# Patient Record
Sex: FEMALE | Race: WHITE | Employment: OTHER | URBAN - METROPOLITAN AREA
[De-identification: names, ages, dates, MRNs, and addresses within clinical notes are randomized per-mention and may not be internally consistent; named-entity substitution may affect disease eponyms.]

---

## 2017-05-17 ENCOUNTER — HOSPITAL ENCOUNTER (EMERGENCY)
Age: 67
Discharge: HOME | End: 2017-05-17
Payer: SUBSIDIZED

## 2017-05-17 ENCOUNTER — APPOINTMENT (OUTPATIENT)
Dept: GENERAL RADIOLOGY | Age: 67
End: 2017-05-17
Payer: SUBSIDIZED

## 2017-05-17 VITALS
OXYGEN SATURATION: 99 % | DIASTOLIC BLOOD PRESSURE: 59 MMHG | RESPIRATION RATE: 16 BRPM | HEART RATE: 72 BPM | TEMPERATURE: 97.7 F | SYSTOLIC BLOOD PRESSURE: 111 MMHG | BODY MASS INDEX: 22.82 KG/M2 | WEIGHT: 124 LBS | HEIGHT: 62 IN

## 2017-05-17 DIAGNOSIS — R19.7 NAUSEA VOMITING AND DIARRHEA: Primary | ICD-10-CM

## 2017-05-17 DIAGNOSIS — R11.2 NAUSEA VOMITING AND DIARRHEA: Primary | ICD-10-CM

## 2017-05-17 LAB
ALBUMIN SERPL BCP-MCNC: 3.8 G/DL (ref 3.2–4.6)
ALBUMIN/GLOB SERPL: 1 {RATIO} (ref 1.2–3.5)
ALP SERPL-CCNC: 94 U/L (ref 50–136)
ALT SERPL-CCNC: 37 U/L (ref 12–65)
ANION GAP BLD CALC-SCNC: 18 MMOL/L (ref 7–16)
AST SERPL W P-5'-P-CCNC: 34 U/L (ref 15–37)
BACTERIA URNS QL MICRO: ABNORMAL /HPF
BASOPHILS # BLD AUTO: 0 K/UL (ref 0–0.2)
BASOPHILS # BLD: 0 % (ref 0–2)
BILIRUB SERPL-MCNC: 0.5 MG/DL (ref 0.2–1.1)
BUN SERPL-MCNC: 6 MG/DL (ref 8–23)
CALCIUM SERPL-MCNC: 9.3 MG/DL (ref 8.3–10.4)
CASTS URNS QL MICRO: ABNORMAL /LPF
CHLORIDE SERPL-SCNC: 89 MMOL/L (ref 98–107)
CO2 SERPL-SCNC: 20 MMOL/L (ref 21–32)
CREAT SERPL-MCNC: 1.07 MG/DL (ref 0.6–1)
DIFFERENTIAL METHOD BLD: ABNORMAL
EOSINOPHIL # BLD: 0 K/UL (ref 0–0.8)
EOSINOPHIL NFR BLD: 0 % (ref 0.5–7.8)
EPI CELLS #/AREA URNS HPF: ABNORMAL /HPF
ERYTHROCYTE [DISTWIDTH] IN BLOOD BY AUTOMATED COUNT: 12.9 % (ref 11.9–14.6)
GLOBULIN SER CALC-MCNC: 3.9 G/DL (ref 2.3–3.5)
GLUCOSE SERPL-MCNC: 135 MG/DL (ref 65–100)
HCT VFR BLD AUTO: 38 % (ref 35.8–46.3)
HGB BLD-MCNC: 13 G/DL (ref 11.7–15.4)
IMM GRANULOCYTES # BLD: 0 K/UL (ref 0–0.5)
IMM GRANULOCYTES NFR BLD AUTO: 0.5 % (ref 0–5)
LYMPHOCYTES # BLD AUTO: 21 % (ref 13–44)
LYMPHOCYTES # BLD: 1.6 K/UL (ref 0.5–4.6)
MCH RBC QN AUTO: 26.9 PG (ref 26.1–32.9)
MCHC RBC AUTO-ENTMCNC: 34.2 G/DL (ref 31.4–35)
MCV RBC AUTO: 78.5 FL (ref 79.6–97.8)
MONOCYTES # BLD: 0.5 K/UL (ref 0.1–1.3)
MONOCYTES NFR BLD AUTO: 7 % (ref 4–12)
NEUTS SEG # BLD: 5.5 K/UL (ref 1.7–8.2)
NEUTS SEG NFR BLD AUTO: 72 % (ref 43–78)
PLATELET # BLD AUTO: 351 K/UL (ref 150–450)
PMV BLD AUTO: 9.5 FL (ref 10.8–14.1)
POTASSIUM SERPL-SCNC: 2.5 MMOL/L (ref 3.5–5.1)
PROT SERPL-MCNC: 7.7 G/DL (ref 6.3–8.2)
RBC # BLD AUTO: 4.84 M/UL (ref 4.05–5.25)
RBC #/AREA URNS HPF: 0 /HPF
SODIUM SERPL-SCNC: 127 MMOL/L (ref 136–145)
WBC # BLD AUTO: 7.6 K/UL (ref 4.3–11.1)
WBC URNS QL MICRO: ABNORMAL /HPF

## 2017-05-17 PROCEDURE — 96375 TX/PRO/DX INJ NEW DRUG ADDON: CPT

## 2017-05-17 PROCEDURE — 96361 HYDRATE IV INFUSION ADD-ON: CPT

## 2017-05-17 PROCEDURE — 81015 MICROSCOPIC EXAM OF URINE: CPT

## 2017-05-17 PROCEDURE — 96365 THER/PROPH/DIAG IV INF INIT: CPT

## 2017-05-17 PROCEDURE — 99284 EMERGENCY DEPT VISIT MOD MDM: CPT

## 2017-05-17 PROCEDURE — 74011250636 HC RX REV CODE- 250/636

## 2017-05-17 PROCEDURE — 85025 COMPLETE CBC W/AUTO DIFF WBC: CPT

## 2017-05-17 PROCEDURE — 80053 COMPREHEN METABOLIC PANEL: CPT

## 2017-05-17 PROCEDURE — 96366 THER/PROPH/DIAG IV INF ADDON: CPT

## 2017-05-17 PROCEDURE — 81003 URINALYSIS AUTO W/O SCOPE: CPT

## 2017-05-17 PROCEDURE — 74011250637 HC RX REV CODE- 250/637

## 2017-05-17 PROCEDURE — 74022 RADEX COMPL AQT ABD SERIES: CPT

## 2017-05-17 RX ORDER — ONDANSETRON 2 MG/ML
4 INJECTION INTRAMUSCULAR; INTRAVENOUS
Status: COMPLETED | OUTPATIENT
Start: 2017-05-17 | End: 2017-05-17

## 2017-05-17 RX ORDER — HYOSCYAMINE SULFATE 0.12 MG/1
0.12 TABLET SUBLINGUAL
Status: COMPLETED | OUTPATIENT
Start: 2017-05-17 | End: 2017-05-17

## 2017-05-17 RX ORDER — HYOSCYAMINE SULFATE 0.125 MG
125 TABLET ORAL
Qty: 8 TAB | Refills: 0 | Status: SHIPPED | OUTPATIENT
Start: 2017-05-17

## 2017-05-17 RX ORDER — LORAZEPAM 2 MG/ML
1 INJECTION INTRAMUSCULAR
Status: COMPLETED | OUTPATIENT
Start: 2017-05-17 | End: 2017-05-17

## 2017-05-17 RX ORDER — ONDANSETRON 2 MG/ML
4 INJECTION INTRAMUSCULAR; INTRAVENOUS
Status: DISCONTINUED | OUTPATIENT
Start: 2017-05-17 | End: 2017-05-17 | Stop reason: SDUPTHER

## 2017-05-17 RX ORDER — DEXTROSE, SODIUM CHLORIDE, AND POTASSIUM CHLORIDE 5; .9; .15 G/100ML; G/100ML; G/100ML
1000 INJECTION INTRAVENOUS ONCE
Status: COMPLETED | OUTPATIENT
Start: 2017-05-17 | End: 2017-05-17

## 2017-05-17 RX ORDER — MORPHINE SULFATE 2 MG/ML
2 INJECTION, SOLUTION INTRAMUSCULAR; INTRAVENOUS
Status: COMPLETED | OUTPATIENT
Start: 2017-05-17 | End: 2017-05-17

## 2017-05-17 RX ORDER — PROMETHAZINE HYDROCHLORIDE 25 MG/1
25 TABLET ORAL
Qty: 12 TAB | Refills: 0 | Status: SHIPPED | OUTPATIENT
Start: 2017-05-17

## 2017-05-17 RX ADMIN — DEXTROSE MONOHYDRATE, SODIUM CHLORIDE, AND POTASSIUM CHLORIDE 1000 ML: 50; 9; 1.49 INJECTION, SOLUTION INTRAVENOUS at 10:56

## 2017-05-17 RX ADMIN — ONDANSETRON 4 MG: 2 INJECTION INTRAMUSCULAR; INTRAVENOUS at 08:50

## 2017-05-17 RX ADMIN — SODIUM CHLORIDE 1000 ML: 900 INJECTION, SOLUTION INTRAVENOUS at 08:51

## 2017-05-17 RX ADMIN — LORAZEPAM 1 MG: 2 INJECTION, SOLUTION INTRAMUSCULAR; INTRAVENOUS at 09:55

## 2017-05-17 RX ADMIN — Medication 2 MG: at 14:05

## 2017-05-17 RX ADMIN — HYOSCYAMINE SULFATE 0.12 MG: 0.12 TABLET SUBLINGUAL at 09:56

## 2017-05-17 NOTE — ED PROVIDER NOTES
HPI Comments: 29-year-old female complaining of nausea vomiting diarrhea onset 2 days ago but was worse last night. Patient feels like she is dehydrated. Patient is a 61 y.o. female presenting with vomiting. Vomiting    The current episode started 2 days ago. The problem has not changed since onset. The emesis has an appearance of stomach contents. There has been no fever. Associated symptoms include a fever and abdominal pain. Risk factors include ill contacts. Her pertinent negatives include no DM. Past Medical History:   Diagnosis Date    CAD (coronary artery disease)     Hypertension        History reviewed. No pertinent surgical history. History reviewed. No pertinent family history. Social History     Social History    Marital status:      Spouse name: N/A    Number of children: N/A    Years of education: N/A     Occupational History    Not on file. Social History Main Topics    Smoking status: Never Smoker    Smokeless tobacco: Not on file    Alcohol use Not on file    Drug use: Not on file    Sexual activity: Not on file     Other Topics Concern    Not on file     Social History Narrative    No narrative on file         ALLERGIES: Pcn [penicillins]    Review of Systems   Constitutional: Positive for fever. Negative for activity change. HENT: Negative. Eyes: Negative. Respiratory: Negative. Cardiovascular: Negative. Gastrointestinal: Positive for abdominal pain and vomiting. Genitourinary: Negative. Musculoskeletal: Negative. Skin: Negative. Neurological: Negative. Psychiatric/Behavioral: Negative. All other systems reviewed and are negative. Vitals:    05/17/17 0848 05/17/17 0918 05/17/17 1018 05/17/17 1048   BP: 133/63 143/72 127/70 134/69   Pulse:       Resp:       Temp:       SpO2:  (!) 88% 100% 99%   Weight:       Height:                Physical Exam   Constitutional: She is oriented to person, place, and time.  She appears well-developed and well-nourished. No distress. HENT:   Head: Normocephalic and atraumatic. Right Ear: External ear normal.   Left Ear: External ear normal.   Nose: Nose normal.   Mouth/Throat: Oropharynx is clear and moist. No oropharyngeal exudate. Eyes: Conjunctivae and EOM are normal. Pupils are equal, round, and reactive to light. Right eye exhibits no discharge. Left eye exhibits no discharge. No scleral icterus. Neck: Normal range of motion. Neck supple. No JVD present. No tracheal deviation present. Cardiovascular: Normal rate, regular rhythm and intact distal pulses. Pulmonary/Chest: Effort normal and breath sounds normal. No stridor. No respiratory distress. She has no wheezes. She exhibits no tenderness. Abdominal: Soft. Bowel sounds are normal. She exhibits no distension and no mass. There is no tenderness. Musculoskeletal: Normal range of motion. She exhibits no edema or tenderness. Neurological: She is alert and oriented to person, place, and time. No cranial nerve deficit. Skin: Skin is warm and dry. No rash noted. She is not diaphoretic. No erythema. No pallor. Psychiatric: She has a normal mood and affect. Her behavior is normal. Thought content normal.   Nursing note and vitals reviewed. MDM  Number of Diagnoses or Management Options  Nausea vomiting and diarrhea:   Diagnosis management comments: Serial exams abdomen no acute peritoneal signs noted patient's symptoms much relieved. Patient hydrated. Patient will take fluids by mouth before discharge.   Will follow up closely with her primary care physician will place her on anti emetic and anti-spasmodic's       Amount and/or Complexity of Data Reviewed  Clinical lab tests: ordered and reviewed  Tests in the radiology section of CPT®: ordered and reviewed  Tests in the medicine section of CPT®: ordered and reviewed      ED Course       Procedures

## 2017-07-06 PROCEDURE — 88305 TISSUE EXAM BY PATHOLOGIST: CPT | Performed by: INTERNAL MEDICINE

## 2017-07-07 ENCOUNTER — HOSPITAL ENCOUNTER (OUTPATIENT)
Dept: LAB | Age: 67
Discharge: HOME | End: 2017-07-07

## 2018-12-18 ENCOUNTER — APPOINTMENT (OUTPATIENT)
Dept: CT IMAGING | Age: 68
DRG: 644 | End: 2018-12-18
Attending: EMERGENCY MEDICINE
Payer: MEDICARE

## 2018-12-18 ENCOUNTER — HOSPITAL ENCOUNTER (INPATIENT)
Age: 68
LOS: 4 days | Discharge: HOME | DRG: 644 | End: 2018-12-22
Attending: EMERGENCY MEDICINE | Admitting: FAMILY MEDICINE
Payer: MEDICARE

## 2018-12-18 DIAGNOSIS — R56.9 SEIZURE (HCC): ICD-10-CM

## 2018-12-18 DIAGNOSIS — E87.1 HYPONATREMIA: Primary | ICD-10-CM

## 2018-12-18 PROBLEM — G40.409 TONIC-CLONIC SEIZURE DISORDER (HCC): Status: ACTIVE | Noted: 2018-12-18

## 2018-12-18 LAB
ANION GAP SERPL CALC-SCNC: 18 MMOL/L (ref 7–16)
APTT PPP: 23.9 SEC (ref 24.7–39.8)
BASOPHILS # BLD: 0 K/UL (ref 0–0.2)
BASOPHILS NFR BLD: 0 % (ref 0–2)
BUN SERPL-MCNC: 7 MG/DL (ref 8–23)
CALCIUM SERPL-MCNC: 8 MG/DL (ref 8.3–10.4)
CHLORIDE SERPL-SCNC: 70 MMOL/L (ref 98–107)
CO2 SERPL-SCNC: 19 MMOL/L (ref 21–32)
CREAT SERPL-MCNC: 1.05 MG/DL (ref 0.6–1)
DIFFERENTIAL METHOD BLD: ABNORMAL
EOSINOPHIL # BLD: 0 K/UL (ref 0–0.8)
EOSINOPHIL NFR BLD: 0 % (ref 0.5–7.8)
ERYTHROCYTE [DISTWIDTH] IN BLOOD BY AUTOMATED COUNT: 11.9 % (ref 11.9–14.6)
GLUCOSE BLD STRIP.AUTO-MCNC: 210 MG/DL (ref 65–100)
GLUCOSE SERPL-MCNC: 171 MG/DL (ref 65–100)
HCT VFR BLD AUTO: 30.1 % (ref 35.8–46.3)
HGB BLD-MCNC: 10.7 G/DL (ref 11.7–15.4)
IMM GRANULOCYTES # BLD: 0.4 K/UL (ref 0–0.5)
IMM GRANULOCYTES NFR BLD AUTO: 3 % (ref 0–5)
INR BLD: 1 (ref 0.9–1.2)
LYMPHOCYTES # BLD: 1.1 K/UL (ref 0.5–4.6)
LYMPHOCYTES NFR BLD: 8 % (ref 13–44)
MAGNESIUM SERPL-MCNC: 1.2 MG/DL (ref 1.8–2.4)
MCH RBC QN AUTO: 27 PG (ref 26.1–32.9)
MCHC RBC AUTO-ENTMCNC: 35.5 G/DL (ref 31.4–35)
MCV RBC AUTO: 75.8 FL (ref 79.6–97.8)
MONOCYTES # BLD: 0.7 K/UL (ref 0.1–1.3)
MONOCYTES NFR BLD: 4 % (ref 4–12)
NEUTS SEG # BLD: 12.6 K/UL (ref 1.7–8.2)
NEUTS SEG NFR BLD: 85 % (ref 43–78)
NRBC # BLD: 0 K/UL (ref 0–0.2)
PHOSPHATE SERPL-MCNC: 1.7 MG/DL (ref 2.3–3.7)
PLATELET # BLD AUTO: 339 K/UL (ref 150–450)
PMV BLD AUTO: 9.9 FL (ref 9.4–12.3)
POTASSIUM SERPL-SCNC: 2.9 MMOL/L (ref 3.5–5.1)
PT BLD: 12.4 SECS (ref 9.6–11.6)
RBC # BLD AUTO: 3.97 M/UL (ref 4.05–5.2)
SODIUM SERPL-SCNC: 107 MMOL/L (ref 136–145)
WBC # BLD AUTO: 14.8 K/UL (ref 4.3–11.1)

## 2018-12-18 PROCEDURE — 80048 BASIC METABOLIC PNL TOTAL CA: CPT

## 2018-12-18 PROCEDURE — 93005 ELECTROCARDIOGRAM TRACING: CPT | Performed by: FAMILY MEDICINE

## 2018-12-18 PROCEDURE — 96374 THER/PROPH/DIAG INJ IV PUSH: CPT | Performed by: EMERGENCY MEDICINE

## 2018-12-18 PROCEDURE — 65610000006 HC RM INTENSIVE CARE

## 2018-12-18 PROCEDURE — 82962 GLUCOSE BLOOD TEST: CPT

## 2018-12-18 PROCEDURE — 83735 ASSAY OF MAGNESIUM: CPT

## 2018-12-18 PROCEDURE — 99285 EMERGENCY DEPT VISIT HI MDM: CPT | Performed by: EMERGENCY MEDICINE

## 2018-12-18 PROCEDURE — 04HK33Z INSERTION OF INFUSION DEVICE INTO RIGHT FEMORAL ARTERY, PERCUTANEOUS APPROACH: ICD-10-PCS | Performed by: EMERGENCY MEDICINE

## 2018-12-18 PROCEDURE — 85730 THROMBOPLASTIN TIME PARTIAL: CPT

## 2018-12-18 PROCEDURE — 75810000137 HC PLCMT CENT VENOUS CATH: Performed by: EMERGENCY MEDICINE

## 2018-12-18 PROCEDURE — 85610 PROTHROMBIN TIME: CPT

## 2018-12-18 PROCEDURE — 85025 COMPLETE CBC W/AUTO DIFF WBC: CPT

## 2018-12-18 PROCEDURE — 74011250636 HC RX REV CODE- 250/636: Performed by: INTERNAL MEDICINE

## 2018-12-18 PROCEDURE — 96375 TX/PRO/DX INJ NEW DRUG ADDON: CPT | Performed by: EMERGENCY MEDICINE

## 2018-12-18 PROCEDURE — 84100 ASSAY OF PHOSPHORUS: CPT

## 2018-12-18 PROCEDURE — C1751 CATH, INF, PER/CENT/MIDLINE: HCPCS

## 2018-12-18 PROCEDURE — 70450 CT HEAD/BRAIN W/O DYE: CPT

## 2018-12-18 PROCEDURE — 74011250636 HC RX REV CODE- 250/636: Performed by: EMERGENCY MEDICINE

## 2018-12-18 RX ORDER — HYDROCODONE BITARTRATE AND ACETAMINOPHEN 5; 325 MG/1; MG/1
1 TABLET ORAL
Status: DISCONTINUED | OUTPATIENT
Start: 2018-12-18 | End: 2018-12-22 | Stop reason: HOSPADM

## 2018-12-18 RX ORDER — LORAZEPAM 0.5 MG/1
0.5 TABLET ORAL
Status: DISCONTINUED | OUTPATIENT
Start: 2018-12-18 | End: 2018-12-22 | Stop reason: HOSPADM

## 2018-12-18 RX ORDER — LORAZEPAM 2 MG/ML
1 INJECTION INTRAMUSCULAR
Status: COMPLETED | OUTPATIENT
Start: 2018-12-18 | End: 2018-12-18

## 2018-12-18 RX ORDER — 3% SODIUM CHLORIDE 3 G/100ML
30 INJECTION, SOLUTION INTRAVENOUS CONTINUOUS
Status: DISCONTINUED | OUTPATIENT
Start: 2018-12-19 | End: 2018-12-19

## 2018-12-18 RX ORDER — ZOLPIDEM TARTRATE 5 MG/1
5 TABLET ORAL
Status: DISCONTINUED | OUTPATIENT
Start: 2018-12-18 | End: 2018-12-19

## 2018-12-18 RX ORDER — ACETAMINOPHEN 325 MG/1
650 TABLET ORAL
Status: DISCONTINUED | OUTPATIENT
Start: 2018-12-18 | End: 2018-12-22 | Stop reason: HOSPADM

## 2018-12-18 RX ORDER — POTASSIUM CHLORIDE 29.8 MG/ML
20 INJECTION INTRAVENOUS
Status: COMPLETED | OUTPATIENT
Start: 2018-12-18 | End: 2018-12-19

## 2018-12-18 RX ORDER — LORAZEPAM 2 MG/ML
2 INJECTION INTRAMUSCULAR
Status: DISCONTINUED | OUTPATIENT
Start: 2018-12-18 | End: 2018-12-18 | Stop reason: ALTCHOICE

## 2018-12-18 RX ORDER — SODIUM CHLORIDE 0.9 % (FLUSH) 0.9 %
5-10 SYRINGE (ML) INJECTION AS NEEDED
Status: DISCONTINUED | OUTPATIENT
Start: 2018-12-18 | End: 2018-12-22 | Stop reason: HOSPADM

## 2018-12-18 RX ORDER — NALOXONE HYDROCHLORIDE 0.4 MG/ML
0.4 INJECTION, SOLUTION INTRAMUSCULAR; INTRAVENOUS; SUBCUTANEOUS AS NEEDED
Status: DISCONTINUED | OUTPATIENT
Start: 2018-12-18 | End: 2018-12-22 | Stop reason: HOSPADM

## 2018-12-18 RX ORDER — 3% SODIUM CHLORIDE 3 G/100ML
50 INJECTION, SOLUTION INTRAVENOUS CONTINUOUS
Status: DISPENSED | OUTPATIENT
Start: 2018-12-18 | End: 2018-12-19

## 2018-12-18 RX ORDER — SODIUM CHLORIDE 0.9 % (FLUSH) 0.9 %
5-10 SYRINGE (ML) INJECTION EVERY 8 HOURS
Status: DISCONTINUED | OUTPATIENT
Start: 2018-12-18 | End: 2018-12-22 | Stop reason: HOSPADM

## 2018-12-18 RX ORDER — ENOXAPARIN SODIUM 100 MG/ML
40 INJECTION SUBCUTANEOUS EVERY 24 HOURS
Status: DISCONTINUED | OUTPATIENT
Start: 2018-12-18 | End: 2018-12-19

## 2018-12-18 RX ORDER — LORAZEPAM 2 MG/ML
1 INJECTION INTRAMUSCULAR
Status: DISCONTINUED | OUTPATIENT
Start: 2018-12-18 | End: 2018-12-18

## 2018-12-18 RX ADMIN — LORAZEPAM 1 MG: 2 INJECTION INTRAMUSCULAR; INTRAVENOUS at 20:52

## 2018-12-18 RX ADMIN — SODIUM CHLORIDE 50 ML/HR: 3 INJECTION, SOLUTION INTRAVENOUS at 23:08

## 2018-12-18 RX ADMIN — POTASSIUM CHLORIDE 20 MEQ: 400 INJECTION, SOLUTION INTRAVENOUS at 23:04

## 2018-12-19 ENCOUNTER — APPOINTMENT (OUTPATIENT)
Dept: GENERAL RADIOLOGY | Age: 68
DRG: 644 | End: 2018-12-19
Attending: FAMILY MEDICINE
Payer: MEDICARE

## 2018-12-19 ENCOUNTER — APPOINTMENT (OUTPATIENT)
Dept: MRI IMAGING | Age: 68
DRG: 644 | End: 2018-12-19
Attending: FAMILY MEDICINE
Payer: MEDICARE

## 2018-12-19 PROBLEM — E83.51 HYPOCALCEMIA: Status: ACTIVE | Noted: 2018-12-19

## 2018-12-19 PROBLEM — E83.42 HYPOMAGNESEMIA: Status: ACTIVE | Noted: 2018-12-19

## 2018-12-19 PROBLEM — E78.5 HLD (HYPERLIPIDEMIA): Status: ACTIVE | Noted: 2018-12-19

## 2018-12-19 PROBLEM — E87.20 METABOLIC ACIDOSIS: Status: ACTIVE | Noted: 2018-12-19

## 2018-12-19 PROBLEM — M81.0 OSTEOPOROSIS: Status: ACTIVE | Noted: 2018-12-19

## 2018-12-19 PROBLEM — E87.6 HYPOKALEMIA: Status: ACTIVE | Noted: 2018-12-19

## 2018-12-19 LAB
ANION GAP SERPL CALC-SCNC: 10 MMOL/L (ref 7–16)
ANION GAP SERPL CALC-SCNC: 12 MMOL/L (ref 7–16)
ANION GAP SERPL CALC-SCNC: 6 MMOL/L (ref 7–16)
ANION GAP SERPL CALC-SCNC: 9 MMOL/L (ref 7–16)
ATRIAL RATE: 214 BPM
BUN SERPL-MCNC: 7 MG/DL (ref 8–23)
BUN SERPL-MCNC: 7 MG/DL (ref 8–23)
BUN SERPL-MCNC: 8 MG/DL (ref 8–23)
BUN SERPL-MCNC: 8 MG/DL (ref 8–23)
CALCIUM SERPL-MCNC: 7.2 MG/DL (ref 8.3–10.4)
CALCIUM SERPL-MCNC: 7.3 MG/DL (ref 8.3–10.4)
CALCIUM SERPL-MCNC: 7.5 MG/DL (ref 8.3–10.4)
CALCIUM SERPL-MCNC: 7.7 MG/DL (ref 8.3–10.4)
CALCULATED R AXIS, ECG10: 36 DEGREES
CALCULATED T AXIS, ECG11: -67 DEGREES
CHLORIDE SERPL-SCNC: 78 MMOL/L (ref 98–107)
CHLORIDE SERPL-SCNC: 87 MMOL/L (ref 98–107)
CHLORIDE SERPL-SCNC: 93 MMOL/L (ref 98–107)
CHLORIDE SERPL-SCNC: 97 MMOL/L (ref 98–107)
CO2 SERPL-SCNC: 21 MMOL/L (ref 21–32)
CO2 SERPL-SCNC: 24 MMOL/L (ref 21–32)
CO2 SERPL-SCNC: 24 MMOL/L (ref 21–32)
CO2 SERPL-SCNC: 25 MMOL/L (ref 21–32)
CREAT SERPL-MCNC: 0.81 MG/DL (ref 0.6–1)
CREAT SERPL-MCNC: 0.81 MG/DL (ref 0.6–1)
CREAT SERPL-MCNC: 0.95 MG/DL (ref 0.6–1)
CREAT SERPL-MCNC: 0.95 MG/DL (ref 0.6–1)
DIAGNOSIS, 93000: NORMAL
ERYTHROCYTE [DISTWIDTH] IN BLOOD BY AUTOMATED COUNT: 11.9 % (ref 11.9–14.6)
EST. AVERAGE GLUCOSE BLD GHB EST-MCNC: 114 MG/DL
GLUCOSE BLD STRIP.AUTO-MCNC: 102 MG/DL (ref 65–100)
GLUCOSE BLD STRIP.AUTO-MCNC: 103 MG/DL (ref 65–100)
GLUCOSE BLD STRIP.AUTO-MCNC: 95 MG/DL (ref 65–100)
GLUCOSE BLD STRIP.AUTO-MCNC: 98 MG/DL (ref 65–100)
GLUCOSE SERPL-MCNC: 100 MG/DL (ref 65–100)
GLUCOSE SERPL-MCNC: 113 MG/DL (ref 65–100)
GLUCOSE SERPL-MCNC: 93 MG/DL (ref 65–100)
GLUCOSE SERPL-MCNC: 93 MG/DL (ref 65–100)
HBA1C MFR BLD: 5.6 % (ref 4.8–6)
HCT VFR BLD AUTO: 27.2 % (ref 35.8–46.3)
HGB BLD-MCNC: 9.5 G/DL (ref 11.7–15.4)
MAGNESIUM SERPL-MCNC: 2 MG/DL (ref 1.8–2.4)
MCH RBC QN AUTO: 26.4 PG (ref 26.1–32.9)
MCHC RBC AUTO-ENTMCNC: 34.9 G/DL (ref 31.4–35)
MCV RBC AUTO: 75.6 FL (ref 79.6–97.8)
NRBC # BLD: 0 K/UL (ref 0–0.2)
PHOSPHATE SERPL-MCNC: 1.5 MG/DL (ref 2.3–3.7)
PHOSPHATE SERPL-MCNC: 1.7 MG/DL (ref 2.3–3.7)
PLATELET # BLD AUTO: 288 K/UL (ref 150–450)
PMV BLD AUTO: 9.3 FL (ref 9.4–12.3)
POTASSIUM SERPL-SCNC: 2.4 MMOL/L (ref 3.5–5.1)
POTASSIUM SERPL-SCNC: 3 MMOL/L (ref 3.5–5.1)
POTASSIUM SERPL-SCNC: 3.8 MMOL/L (ref 3.5–5.1)
POTASSIUM SERPL-SCNC: 4.1 MMOL/L (ref 3.5–5.1)
Q-T INTERVAL, ECG07: 370 MS
QRS DURATION, ECG06: 104 MS
QTC CALCULATION (BEZET), ECG08: 407 MS
RBC # BLD AUTO: 3.6 M/UL (ref 4.05–5.2)
SODIUM SERPL-SCNC: 112 MMOL/L (ref 136–145)
SODIUM SERPL-SCNC: 120 MMOL/L (ref 136–145)
SODIUM SERPL-SCNC: 126 MMOL/L (ref 136–145)
SODIUM SERPL-SCNC: 128 MMOL/L (ref 136–145)
T4 FREE SERPL-MCNC: 1.3 NG/DL (ref 0.78–1.46)
TSH SERPL DL<=0.005 MIU/L-ACNC: 2.88 UIU/ML (ref 0.36–3.74)
VENTRICULAR RATE, ECG03: 73 BPM
WBC # BLD AUTO: 10.5 K/UL (ref 4.3–11.1)

## 2018-12-19 PROCEDURE — 74011250636 HC RX REV CODE- 250/636: Performed by: INTERNAL MEDICINE

## 2018-12-19 PROCEDURE — 65610000001 HC ROOM ICU GENERAL

## 2018-12-19 PROCEDURE — 85027 COMPLETE CBC AUTOMATED: CPT

## 2018-12-19 PROCEDURE — 74011250637 HC RX REV CODE- 250/637: Performed by: FAMILY MEDICINE

## 2018-12-19 PROCEDURE — 84439 ASSAY OF FREE THYROXINE: CPT

## 2018-12-19 PROCEDURE — 65270000029 HC RM PRIVATE

## 2018-12-19 PROCEDURE — 77030034850

## 2018-12-19 PROCEDURE — 74011250637 HC RX REV CODE- 250/637: Performed by: INTERNAL MEDICINE

## 2018-12-19 PROCEDURE — 36415 COLL VENOUS BLD VENIPUNCTURE: CPT

## 2018-12-19 PROCEDURE — 36592 COLLECT BLOOD FROM PICC: CPT

## 2018-12-19 PROCEDURE — 70551 MRI BRAIN STEM W/O DYE: CPT

## 2018-12-19 PROCEDURE — 84100 ASSAY OF PHOSPHORUS: CPT

## 2018-12-19 PROCEDURE — 95816 EEG AWAKE AND DROWSY: CPT | Performed by: FAMILY MEDICINE

## 2018-12-19 PROCEDURE — 74011000258 HC RX REV CODE- 258: Performed by: FAMILY MEDICINE

## 2018-12-19 PROCEDURE — 84443 ASSAY THYROID STIM HORMONE: CPT

## 2018-12-19 PROCEDURE — 51798 US URINE CAPACITY MEASURE: CPT

## 2018-12-19 PROCEDURE — 74011250636 HC RX REV CODE- 250/636: Performed by: FAMILY MEDICINE

## 2018-12-19 PROCEDURE — 82962 GLUCOSE BLOOD TEST: CPT

## 2018-12-19 PROCEDURE — 80048 BASIC METABOLIC PNL TOTAL CA: CPT

## 2018-12-19 PROCEDURE — 74011000250 HC RX REV CODE- 250: Performed by: FAMILY MEDICINE

## 2018-12-19 PROCEDURE — 83735 ASSAY OF MAGNESIUM: CPT

## 2018-12-19 PROCEDURE — 02HV33Z INSERTION OF INFUSION DEVICE INTO SUPERIOR VENA CAVA, PERCUTANEOUS APPROACH: ICD-10-PCS | Performed by: INTERNAL MEDICINE

## 2018-12-19 PROCEDURE — 83036 HEMOGLOBIN GLYCOSYLATED A1C: CPT

## 2018-12-19 PROCEDURE — 71045 X-RAY EXAM CHEST 1 VIEW: CPT

## 2018-12-19 PROCEDURE — 77030020250 HC SOL INJ D 5% LFCR 1000ML BG LF

## 2018-12-19 PROCEDURE — 77030019605

## 2018-12-19 PROCEDURE — C1751 CATH, INF, PER/CENT/MIDLINE: HCPCS

## 2018-12-19 PROCEDURE — 36569 INSJ PICC 5 YR+ W/O IMAGING: CPT | Performed by: INTERNAL MEDICINE

## 2018-12-19 PROCEDURE — 76937 US GUIDE VASCULAR ACCESS: CPT

## 2018-12-19 RX ORDER — ASPIRIN 81 MG/1
81 TABLET ORAL DAILY
COMMUNITY

## 2018-12-19 RX ORDER — HEPARIN SODIUM 5000 [USP'U]/ML
5000 INJECTION, SOLUTION INTRAVENOUS; SUBCUTANEOUS EVERY 8 HOURS
Status: DISCONTINUED | OUTPATIENT
Start: 2018-12-19 | End: 2018-12-22 | Stop reason: HOSPADM

## 2018-12-19 RX ORDER — SODIUM CHLORIDE 0.9 % (FLUSH) 0.9 %
20 SYRINGE (ML) INJECTION EVERY 8 HOURS
Status: DISCONTINUED | OUTPATIENT
Start: 2018-12-19 | End: 2018-12-22 | Stop reason: HOSPADM

## 2018-12-19 RX ORDER — SODIUM CHLORIDE 0.9 % (FLUSH) 0.9 %
20 SYRINGE (ML) INJECTION AS NEEDED
Status: DISCONTINUED | OUTPATIENT
Start: 2018-12-19 | End: 2018-12-22 | Stop reason: HOSPADM

## 2018-12-19 RX ORDER — MAGNESIUM SULFATE 1 G/100ML
1 INJECTION INTRAVENOUS
Status: COMPLETED | OUTPATIENT
Start: 2018-12-19 | End: 2018-12-19

## 2018-12-19 RX ORDER — POTASSIUM CHLORIDE 14.9 MG/ML
20 INJECTION INTRAVENOUS
Status: COMPLETED | OUTPATIENT
Start: 2018-12-19 | End: 2018-12-19

## 2018-12-19 RX ORDER — HEPARIN 100 UNIT/ML
600 SYRINGE INTRAVENOUS EVERY 8 HOURS
Status: DISCONTINUED | OUTPATIENT
Start: 2018-12-19 | End: 2018-12-22 | Stop reason: HOSPADM

## 2018-12-19 RX ORDER — HEPARIN 100 UNIT/ML
600 SYRINGE INTRAVENOUS AS NEEDED
Status: DISCONTINUED | OUTPATIENT
Start: 2018-12-19 | End: 2018-12-22 | Stop reason: HOSPADM

## 2018-12-19 RX ORDER — DEXTROSE MONOHYDRATE 50 MG/ML
50 INJECTION, SOLUTION INTRAVENOUS CONTINUOUS
Status: DISCONTINUED | OUTPATIENT
Start: 2018-12-19 | End: 2018-12-22

## 2018-12-19 RX ORDER — HEPARIN SODIUM 5000 [USP'U]/ML
5000 INJECTION, SOLUTION INTRAVENOUS; SUBCUTANEOUS EVERY 8 HOURS
Status: DISCONTINUED | OUTPATIENT
Start: 2018-12-19 | End: 2018-12-19

## 2018-12-19 RX ORDER — ASCORBIC ACID 500 MG
500 TABLET ORAL DAILY
Status: DISCONTINUED | OUTPATIENT
Start: 2018-12-19 | End: 2018-12-22 | Stop reason: HOSPADM

## 2018-12-19 RX ORDER — SODIUM BICARBONATE 650 MG/1
650 TABLET ORAL EVERY 6 HOURS
Status: DISCONTINUED | OUTPATIENT
Start: 2018-12-19 | End: 2018-12-22 | Stop reason: HOSPADM

## 2018-12-19 RX ORDER — INSULIN LISPRO 100 [IU]/ML
INJECTION, SOLUTION INTRAVENOUS; SUBCUTANEOUS
Status: DISCONTINUED | OUTPATIENT
Start: 2018-12-19 | End: 2018-12-22 | Stop reason: HOSPADM

## 2018-12-19 RX ORDER — MELATONIN
5000 DAILY
Status: DISCONTINUED | OUTPATIENT
Start: 2018-12-19 | End: 2018-12-22 | Stop reason: HOSPADM

## 2018-12-19 RX ORDER — POTASSIUM CHLORIDE 14.9 MG/ML
20 INJECTION INTRAVENOUS
Status: DISCONTINUED | OUTPATIENT
Start: 2018-12-19 | End: 2018-12-19

## 2018-12-19 RX ADMIN — LORAZEPAM 0.5 MG: 0.5 TABLET ORAL at 00:42

## 2018-12-19 RX ADMIN — VITAMIN D, TAB 1000IU (100/BT) 5000 UNITS: 25 TAB at 10:09

## 2018-12-19 RX ADMIN — HYDROCODONE BITARTRATE AND ACETAMINOPHEN 1 TABLET: 5; 325 TABLET ORAL at 18:22

## 2018-12-19 RX ADMIN — POTASSIUM CHLORIDE 20 MEQ: 200 INJECTION, SOLUTION INTRAVENOUS at 09:09

## 2018-12-19 RX ADMIN — Medication 20 ML: at 21:17

## 2018-12-19 RX ADMIN — HEPARIN SODIUM 5000 UNITS: 5000 INJECTION INTRAVENOUS; SUBCUTANEOUS at 06:21

## 2018-12-19 RX ADMIN — HEPARIN SODIUM 5000 UNITS: 5000 INJECTION INTRAVENOUS; SUBCUTANEOUS at 21:17

## 2018-12-19 RX ADMIN — POTASSIUM CHLORIDE 20 MEQ: 200 INJECTION, SOLUTION INTRAVENOUS at 04:09

## 2018-12-19 RX ADMIN — SODIUM BICARBONATE 650 MG TABLET 650 MG: at 06:21

## 2018-12-19 RX ADMIN — OXYCODONE HYDROCHLORIDE AND ACETAMINOPHEN 500 MG: 500 TABLET ORAL at 10:09

## 2018-12-19 RX ADMIN — POTASSIUM CHLORIDE 20 MEQ: 200 INJECTION, SOLUTION INTRAVENOUS at 18:25

## 2018-12-19 RX ADMIN — SODIUM CHLORIDE 500 MG: 900 INJECTION, SOLUTION INTRAVENOUS at 14:02

## 2018-12-19 RX ADMIN — Medication 10 ML: at 01:15

## 2018-12-19 RX ADMIN — DEXTROSE MONOHYDRATE 50 ML/HR: 5 INJECTION, SOLUTION INTRAVENOUS at 16:25

## 2018-12-19 RX ADMIN — SODIUM CHLORIDE, PRESERVATIVE FREE 600 UNITS: 5 INJECTION INTRAVENOUS at 16:24

## 2018-12-19 RX ADMIN — POTASSIUM CHLORIDE 20 MEQ: 200 INJECTION, SOLUTION INTRAVENOUS at 01:56

## 2018-12-19 RX ADMIN — SODIUM CHLORIDE, PRESERVATIVE FREE 600 UNITS: 5 INJECTION INTRAVENOUS at 21:17

## 2018-12-19 RX ADMIN — POTASSIUM PHOSPHATE, MONOBASIC AND POTASSIUM PHOSPHATE, DIBASIC: 224; 236 INJECTION, SOLUTION INTRAVENOUS at 06:21

## 2018-12-19 RX ADMIN — SODIUM BICARBONATE 650 MG TABLET 650 MG: at 12:14

## 2018-12-19 RX ADMIN — Medication 20 ML: at 16:24

## 2018-12-19 RX ADMIN — SODIUM BICARBONATE 650 MG TABLET 650 MG: at 18:13

## 2018-12-19 RX ADMIN — ACETAMINOPHEN 650 MG: 325 TABLET, FILM COATED ORAL at 12:17

## 2018-12-19 RX ADMIN — SODIUM CHLORIDE 1000 MG: 900 INJECTION, SOLUTION INTRAVENOUS at 01:56

## 2018-12-19 RX ADMIN — SODIUM BICARBONATE 650 MG TABLET 650 MG: at 01:56

## 2018-12-19 RX ADMIN — POTASSIUM CHLORIDE 20 MEQ: 200 INJECTION, SOLUTION INTRAVENOUS at 12:12

## 2018-12-19 RX ADMIN — HEPARIN SODIUM 5000 UNITS: 5000 INJECTION INTRAVENOUS; SUBCUTANEOUS at 14:07

## 2018-12-19 RX ADMIN — Medication 10 ML: at 21:18

## 2018-12-19 RX ADMIN — POTASSIUM CHLORIDE 20 MEQ: 200 INJECTION, SOLUTION INTRAVENOUS at 16:26

## 2018-12-19 RX ADMIN — Medication 10 ML: at 14:02

## 2018-12-19 RX ADMIN — Medication 10 ML: at 06:22

## 2018-12-19 RX ADMIN — MAGNESIUM SULFATE HEPTAHYDRATE 1 G: 1 INJECTION, SOLUTION INTRAVENOUS at 01:12

## 2018-12-19 NOTE — CONSULTS
Holding Note -    Asked by Dr. Virginia Hastings to advise regarding correction of hyponatremia -   1. Hyponatremia - 3% saline 500 mL to run at 50 mL/hr x 4 hours, then 30 mL/hr x 10 hours  2. Hypokalemia - KCL 20 mEq IV q 4 hours x three doses  3. Acidemia - sodium bicarbonate 650 mg q 6 hours PO  4. Hypocalcemia - Vitamin G 5000 units PO daily  5. Add Magnesium and phosphorus level now and daily  6. BMP q 6 hours       Results for Raman Merritt" (MRN [id number]) as of 12/18/2018 22:32   Ref. Range 5/17/2017 07:54 12/18/2018 20:50   Sodium Latest Ref Range: 136 - 145 mmol/L 127 (L) 107 (LL)   Potassium Latest Ref Range: 3.5 - 5.1 mmol/L 2.5 (LL) 2.9 (LL)   Chloride Latest Ref Range: 98 - 107 mmol/L 89 (L) 70 (L)   CO2 Latest Ref Range: 21 - 32 mmol/L 20 (L) 19 (L)   Anion gap Latest Ref Range: 7 - 16 mmol/L 18 (H) 18 (H)   Glucose Latest Ref Range: 65 - 100 mg/dL 135 (H) 171 (H)   BUN Latest Ref Range: 8 - 23 MG/DL 6 (L) 7 (L)   Creatinine Latest Ref Range: 0.6 - 1.0 MG/DL 1.07 (H) 1.05 (H)   Calcium Latest Ref Range: 8.3 - 10.4 MG/DL 9.3 8.0 (L)   GFR est non-AA Latest Ref Range: >60 ml/min/1.73m2 55 (L) 56 (L)   GFR est AA Latest Ref Range: >60 ml/min/1.73m2 >60 >60   Bilirubin, total Latest Ref Range: 0.2 - 1.1 MG/DL 0.5    Protein, total Latest Ref Range: 6.3 - 8.2 g/dL 7.7    Albumin Latest Ref Range: 3.2 - 4.6 g/dL 3.8    Globulin Latest Ref Range: 2.3 - 3.5 g/dL 3.9 (H)    A-G Ratio Latest Ref Range: 1.2 - 3.5   1.0 (L)    ALT (SGPT) Latest Ref Range: 12 - 65 U/L 37    AST Latest Ref Range: 15 - 37 U/L 34    Alk.  phosphatase Latest Ref Range: 50 - 136 U/L 94

## 2018-12-19 NOTE — PROGRESS NOTES
Dr. Kelechi Landry called and updated on previous BMP, Na+ up to 126 (6 point increase in 6 hours after stopping 3% saline) and K+ up to 3.8 with an order for 40 mEq left to infuse. Per Dr. Kelechi Landry, infuse remaining potassium chloride ordered and start D5W gtt at 50 ml/hr. New orders placed in [facility] care.

## 2018-12-19 NOTE — PROGRESS NOTES
Bedside shift change report given to Nito Still (oncoming nurse) by Ahmet Damico RN (offgoing nurse). Report included the following information SBAR, Kardex, ED Summary, Intake/Output, MAR, Recent Results, Med Rec Status, Cardiac Rhythm NSR and Alarm Parameters . Skin assessed. Patient turns self. Dual neuro assessment completed at shift change, alert, follows commands, oriented to person and time, disoriented to place and situation. PERRLA,  3 mm. Very restless/fidgety with any stimulation. NSR on monitor, BP stable. Breath sounds clear, room air. Abdomen is soft, intact, bowel sounds active. Reynolds cath in place, draining clear, yellow urine. Moves all extremities equally, no numbness/tingling noted. Pulse palpable and regular, no edema. No c/o pain at this time. VSS. NAD.

## 2018-12-19 NOTE — PROCEDURES
[facility] Neurology   Routine Electroencephalogram Report      DATE:  December 19, 2018; [id number]     EEG Number:  [id number]    Indication:  Possible seizure    Medications:   Current Facility-Administered Medications   Medication Dose Route Frequency Provider Last Rate Last Dose    ascorbic acid (vitamin C) (VITAMIN C) tablet 500 mg  500 mg Oral DAILY Elizabeth Grumbles, DO   500 mg at 12/19/18 1009    sodium bicarbonate tablet 650 mg  650 mg Oral Q6H Elizaebth Grumbles, DO   650 mg at 12/19/18 1214    levETIRAcetam (KEPPRA) 500 mg in 0.9% sodium chloride 100 mL IVPB  500 mg IntraVENous Q12H Elizabeth Grumbles, DO   500 mg at 12/19/18 1402    insulin lispro (HUMALOG) injection   SubCUTAneous AC&HS Elizabeth Grumbles, DO   Stopped at 12/19/18 0730    heparin (porcine) injection 5,000 Units  5,000 Units SubCUTAneous Q8H Elizabeth Grumbles, DO   5,000 Units at 12/19/18 1407    potassium chloride 20 mEq in 100 ml IVPB  20 mEq IntraVENous Q3H Valeri Ring MD 50 mL/hr at 12/19/18 1626 20 mEq at 12/19/18 1626    cholecalciferol (VITAMIN D3) tablet 5,000 Units  5,000 Units Oral DAILY Valeri Ring MD   5,000 Units at 12/19/18 1009    sodium chloride (NS) flush 20 mL  20 mL InterCATHeter Q8H Hung Morales Code, DO   20 mL at 12/19/18 1624    heparin (porcine) pf 600 Units  600 Units InterCATHeter Memo Boas, DO   600 Units at 12/19/18 1624    sodium chloride (NS) flush 20 mL  20 mL InterCATHeter PRN Elizabeth Grumbles, DO        heparin (porcine) pf 600 Units  600 Units InterCATHeter PRN Elizabeth Grumbles, DO        dextrose 5% infusion  50 mL/hr IntraVENous CONTINUOUS Valeri Ring MD 50 mL/hr at 12/19/18 1625 50 mL/hr at 12/19/18 1625    sodium chloride (NS) flush 5-10 mL  5-10 mL IntraVENous Q8H Carlos Mendez MD   10 mL at 12/19/18 1402    sodium chloride (NS) flush 5-10 mL  5-10 mL IntraVENous PRN Carlos Mendez MD        acetaminophen (TYLENOL) tablet 650 mg  650 mg Oral Q4H PRN Kanwal Lent, DO   650 mg at 12/19/18 1217    HYDROcodone-acetaminophen (NORCO) 5-325 mg per tablet 1 Tab  1 Tab Oral Q4H PRN Kanwal Lent, DO        naloxone [facility]) injection 0.4 mg  0.4 mg IntraVENous PRN Kanwal Lent, DO        LORazepam (ATIVAN) tablet 0.5 mg  0.5 mg Oral Q4H PRN Kanwal Lent, DO   0.5 mg at 12/19/18 0288       Technique: The EEG was recorded on a 32 channel KiteBit digital EEG machine. A full electrode headset was applied in accordance with the International 10-20 System of Electrode Placement. All impedances are less than 5000 Ohms. State of Consciousness: awake, drowsy and asleep       Description: The waking EEG reveals intermittent 8.5-9 Hz, 30-50 µV activity symmetrically in the posterior head regions bilaterally. Reactivity eye opening and eye closure is good. Increased 20-30 µV 18-20 Hz fast activity is noted diffusely. Much EMG artifact. Portions of the record. During drowsiness diffuse mixed frequency 5-7 Hz 50-70 µV slowing is seen in the frontal and central head regions bilaterally and symmetrically. During stage II sleep symmetrical vertex sharp waves and sleep spindles are present in the frontal and central head regions. No focal slowing or epileptiform activity is seen    Activation Procedures:  Hyperventilation: Did not alter the record   Photic Stimulation: Did not alter the record        Interpretation: Normal electroencephalogram, awake, asleep and with activation procedures. There are no focal lateralizing or epileptiform features. Increased fast activity suggests medication effect :query benzodiazepines

## 2018-12-19 NOTE — PROGRESS NOTES
PICC Placement Note    PRE-PROCEDURE VERIFICATION  Correct Procedure: yes. Time out completed with assistant Lisa Masters RN and all persons present in agreement with time out. Correct Site:  yes  Temperature: Temp: 98.6 °F (37 °C), Temperature Source: Temp Source: Oral  Recent Labs     12/19/18  0859  12/19/18  0300  12/18/18  2049   BUN 8   < >  --    < >  --    CREA 0.81   < >  --    < >  --    PLT  --   --  288   < >  --    INR  --   --   --   --  1.0   WBC  --   --  10.5   < >  --     < > = values in this interval not displayed. Allergies: Pcn [penicillins]  Education materials for [facility] given to patient or family. PROCEDURE DETAIL  A double lumen PICC line was started for vascular access and desire for reliable access. The following documentation is in addition to the PICC properties in the lines/airways flowsheet :  Lot #: [id number]  xylocaine used: yes  Mid-Arm Circumference: 27 (cm)  Internal Catheter Length: 38 (cm)  Internal Catheter Total Length: 39 (cm)  Vein Selection for PICC:right basilic  Central Line Bundle followed yes  Complication Related to Insertion: none  Both the insertion guidewire and ECG guidewire were removed intact all ports have positive blood return and were flush well with normal saline. The location of the tip of the PICC is verified using ECG technology. The tip is in the SVC per ECG reading. See image below.          Line is okay to use: yes

## 2018-12-19 NOTE — PROGRESS NOTES
TRANSFER - IN REPORT:    Verbal report received from alexandra hansen(name) on Melvi Alvarez  being received from ed(unit) for routine progression of care      Report consisted of patients Situation, Background, Assessment and   Recommendations(SBAR). Information from the following report(s) ED Summary was reviewed with the receiving nurse. Opportunity for questions and clarification was provided. Assessment completed upon patients arrival to unit and care assumed.

## 2018-12-19 NOTE — PROGRESS NOTES
Dr. Greer Luis notified of Na+ level increased 8 points in 6 hour period, 112 to 120. Lab stick accurate due to 3% saline infusing throughout CVC in groin and lab drawn peripheral stick to left arm. New orders received to stop 3% Saline gtt. Re-check BMP in 6 hours and call with Na+ result.

## 2018-12-19 NOTE — PROGRESS NOTES
Pt seen in ICU s/p admission Hyponatremia/Tonic clonic seizure disorder. Resting quietly in bed currently. Spouse and family at bedside. Spouse confirms demographics. No needs voiced at present for d/c. CM to follow for any needs per MD.     Care Management Interventions  PCP Verified by CM: Yes(Iban Francois per spouse)  Mode of Transport at Discharge:  Other (see comment)  Transition of Care Consult (CM Consult): Discharge Planning  Discharge Durable Medical Equipment: (none currently)  Current Support Network: Lives with Spouse, Own Home(pt lives with spouse, Trev Kuhn -[phone], has son and daughter that live close by.)  Confirm Follow Up Transport: Self(driving self  prior to admission)  Plan discussed with Pt/Family/Caregiver: Yes  Freedom of Choice Offered: Yes  [employer] Information Provided?: (confirms MCR/ no supplemental - able to get rx with assist)  Discharge Location  Discharge Placement: Unable to determine at this time

## 2018-12-19 NOTE — INTERDISCIPLINARY ROUNDS
Interdisciplinary team rounds were held 12/19/2018 with the following team members:Care Management, Nursing, Nurse Practitioner, Nutrition, Palliative Care, Pastoral Care, Pharmacy, Physical Therapy, Physician, Respiratory Therapy and Clinical Coordinator. Plan of care discussed. See clinical pathway and/or care plan for interventions and desired outcomes.

## 2018-12-19 NOTE — ED TRIAGE NOTES
Patient arrives via EMS from home with altered mental status. EMS states they were called by her  after patient had a \"shaking fit\" and went unresponsive. When EMS arrived patient was unresponsive but shortly became responsive but has no command following. Patient arrives to ED responsive to verbal stimuli but with poor command following. Dr. Andrew Harris evaluating patient in triage.

## 2018-12-19 NOTE — H&P
Hospitalist Admission History and Physical     NAME:  Tanika Young   Age:  79 y.o.  :   1950   MRN:   [id number]  PCP: Mona Maldonado MD  Consulting MD:  Treatment Team: Attending Provider: Bo Kumar DO; Consulting Provider: Chad Shoemaker MD    Chief Complaint   Patient presents with    Altered mental status         HPI:   Patient is a 79 y.o. female who presented to the ED for a cc of AMS along with seizure like activity. Patient has a hx significant for HTN, osteoporosis, anxiety, HLD, and recent diagnosis of CAP this past  to which she was placed on both azithromycin and Levofloxacin. Since , patient also has been noted to have been drinking several sprites, water, and Gatorade. This evening around 7:30pm seizure like activity noted with moving of extremities bilaterally. No hx of seizures in the past. No ETOH abuse. Has not taken her Ativan in several weeks. Seizure like activity lasted for 1 minute. Has not been fully awake since the seizure like activity but now will only arose occassionally. Vitals stable. Labs - Glucose 210, WBC 14.8, absolute neutrophils 12.6, Na 107, K 2.9, CO2 19, Creatine 1.05  Past Medical History:   Diagnosis Date    CAD (coronary artery disease)     Hypertension         Hysterectomy   No significant family hx. Social History     Social History Narrative    Not on file        Social History     Tobacco Use    Smoking status: Never Smoker   Substance Use Topics    Alcohol use: Not on file        Social History     Substance and Sexual Activity   Drug Use Not on file         Allergies   Allergen Reactions    Pcn [Penicillins] Rash       Prior to Admission medications    Medication Sig Start Date End Date Taking? Authorizing Provider   promethazine (PHENERGAN) 25 mg tablet Take 1 Tab by mouth every six (6) hours as needed.  17   Art Brown MD   hyoscyamine (LEVSIN) 0.125 mg tablet Take 1 Tab by mouth every four (4) hours as needed for Cramping. 5/17/17   Julian Hinkle MD           Review of Systems    Cannot obtain ROS from patient due to AMS      Objective:     Visit Vitals  /78   Pulse 61   Temp 97.3 °F (36.3 °C)   Resp 20   Ht 5' 2\" (1.575 m)   Wt 56.2 kg (124 lb)   SpO2 98%   BMI 22.68 kg/m²        No intake/output data recorded. No intake/output data recorded. Data Review:   Recent Results (from the past 24 hour(s))   GLUCOSE, POC    Collection Time: 12/18/18  8:41 PM   Result Value Ref Range    Glucose (POC) 210 (H) 65 - 100 mg/dL   POC PT/INR    Collection Time: 12/18/18  8:49 PM   Result Value Ref Range    Prothrombin time (POC) 12.4 (H) 9.6 - 11.6 SECS    INR (POC) 1.0 0.9 - 1.2     CBC WITH AUTOMATED DIFF    Collection Time: 12/18/18  8:50 PM   Result Value Ref Range    WBC 14.8 (H) 4.3 - 11.1 K/uL    RBC 3.97 (L) 4.05 - 5.2 M/uL    HGB 10.7 (L) 11.7 - 15.4 g/dL    HCT 30.1 (L) 35.8 - 46.3 %    MCV 75.8 (L) 79.6 - 97.8 FL    MCH 27.0 26.1 - 32.9 PG    MCHC 35.5 (H) 31.4 - 35.0 g/dL    RDW 11.9 11.9 - 14.6 %    PLATELET 843 888 - 848 K/uL    MPV 9.9 9.4 - 12.3 FL    ABSOLUTE NRBC 0.00 0.0 - 0.2 K/uL    DF AUTOMATED      NEUTROPHILS 85 (H) 43 - 78 %    LYMPHOCYTES 8 (L) 13 - 44 %    MONOCYTES 4 4.0 - 12.0 %    EOSINOPHILS 0 (L) 0.5 - 7.8 %    BASOPHILS 0 0.0 - 2.0 %    IMMATURE GRANULOCYTES 3 0.0 - 5.0 %    ABS. NEUTROPHILS 12.6 (H) 1.7 - 8.2 K/UL    ABS. LYMPHOCYTES 1.1 0.5 - 4.6 K/UL    ABS. MONOCYTES 0.7 0.1 - 1.3 K/UL    ABS. EOSINOPHILS 0.0 0.0 - 0.8 K/UL    ABS. BASOPHILS 0.0 0.0 - 0.2 K/UL    ABS. IMM. GRANS.  0.4 0.0 - 0.5 K/UL   METABOLIC PANEL, BASIC    Collection Time: 12/18/18  8:50 PM   Result Value Ref Range    Sodium 107 (LL) 136 - 145 mmol/L    Potassium 2.9 (LL) 3.5 - 5.1 mmol/L    Chloride 70 (L) 98 - 107 mmol/L    CO2 19 (L) 21 - 32 mmol/L    Anion gap 18 (H) 7 - 16 mmol/L    Glucose 171 (H) 65 - 100 mg/dL    BUN 7 (L) 8 - 23 MG/DL    Creatinine 1.05 (H) 0.6 - 1.0 MG/DL    GFR est AA >60 >60 ml/min/1.73m2    GFR est non-AA 56 (L) >60 ml/min/1.73m2    Calcium 8.0 (L) 8.3 - 10.4 MG/DL   PTT    Collection Time: 12/18/18  8:50 PM   Result Value Ref Range    aPTT 23.9 (L) 24.7 - 39.8 SEC   MAGNESIUM    Collection Time: 12/18/18 10:40 PM   Result Value Ref Range    Magnesium 1.2 (LL) 1.8 - 2.4 mg/dL   PHOSPHORUS    Collection Time: 12/18/18 10:40 PM   Result Value Ref Range    Phosphorus 1.7 (L) 2.3 - 3.7 MG/DL       Physical Exam:     General:  Will start to arouse when stimulated but will fall back asleep. Does not answer to questions    Eyes:  Pupils are slow to react to light    Ears:  Normal TMs and external ear canals both ears. Nose: Nares normal. Septum midline. Mouth/Throat: Lips, mucosa, and tongue normal. Teeth and gums normal.   Neck:  no JVD. Back:   deferred   Lungs:   Clear to auscultation bilaterally. Heart:  Regular rate and rhythm, S1, S2 normal, no murmur, click, rub or gallop. Abdomen:   Soft, non-tender. Bowel sounds normal. No masses,  No organomegaly. Extremities: Extremities normal, atraumatic, no cyanosis or edema. Good sensation to LE bilaterally. Right femoral line. Pulses: 2+ and symmetric all extremities. Skin: Skin color, texture, turgor normal. No rashes or lesions   Lymph nodes: Cervical, supraclavicular, and axillary nodes normal.   Neurologic: Will withdrawal from pain. Assessment and Plan     Principal Problem:    Hyponatremia (12/18/2018)    Active Problems:    Tonic-clonic seizure disorder ([facility] [address] (13/41/0135)      Metabolic acidosis (38/32/7941)      HLD (hyperlipidemia) (12/19/2018)      Osteoporosis (12/19/2018)      Hypomagnesemia (12/19/2018)      Hypocalcemia (12/19/2018)      Hypokalemia (12/19/2018)    Severe hyponatremia - Nephrology has been consulted. Patient has central line and now receiving hypertonic saline 3% 500ml to run at 50ml/hr for a total of 4 hours and then change to 30ml/hr for a total of 10 hours.  Possible hyponatremia from SIADH? Tonic clonic seizure disorder - Likely from hyponatremia along with taking medications that can lower seizure threshold. Tele neuro consulted who recommended MRI brain without contrast, routine EEG, PRN Ativan, and Keppra 1g with 500mg BID if remains altered. Metabolic acidosis - possibly from seizure disorder. Correcting Na. Na bicarb 650mg PO q 6 hours. Nephrology following. Hypomagnesemia and hypophosphatemia - Further recommendations by nephrology. Mg sulfate 1g now. Repeat later this AM.     Low vitamin C - Supplement with vitamin C 500mg daily. HTN - Holding home medications until more alert. BP controlled. Patient was diagnosed with CAP before coming to ED. No fever or cough on exam. Satting well on RA so unsure if patient has true CAP. Will hold off on antibiotics but will order chest x ray. Elevated glucose levels likely stress induced. Order SS. Check A1C.      DVT prophylaxis - heparin     Signed By: Rick Cazares,    December 19, 2018

## 2018-12-19 NOTE — CONSULTS
LEAPFROG PROTOCOL NOTE    Emiliano Serrano  12/19/2018    The patient is currently in the critical care setting managed by Dr. Sadie Dyer and nephrology. Was admited with AMS and presumed seizure activity, hyponatremia and hypokalemia. The patient's chart is reviewed and the patient is discussed with the staff. Patient is currently hemodynamically stable and on room air. Patient has no needs identified for Intensivist management in the critical care setting at this time. Please notify us if can be of assistance. No charge billed to the patient. Thank you. Mary Hernandez NP    Agree.     Brittni May MD

## 2018-12-19 NOTE — PROGRESS NOTES
Pt to room 3103 from er and placed on monitor. HR 60 nsr, sbp 110s, spo2 100% on RA, afebrile. Pt is drowsy, oriented x 3, pupils are equal and reactive, and all extremities move equally. Even though pt is oriented x 3, she is still confused about medical equipment and continues to try and pull at things and get out of bed. Pt is placed in restraints with a lap belt for her safety. 3% infusing via central line at 50cc/hr, magnesium and potassium are being replaced per orders. Lung sounds are clear, bs +. Bladder scanner shows >700ccs, li placed without difficulty. Full assessment in flow sheet. Lines: triple cvc R groin  Drains: li  Gtts: 3% NaCl    Skin is free from break down or open areas. Allevyn applied to sacrum for pt safety. Dual skin assessment completed with Aruna Tineo RN.

## 2018-12-19 NOTE — PROGRESS NOTES
Initial visit was made, prayer, emotional support and a spiritual presence were provided.  card was left with her , Cyn Ahuja. Her two sisters, Olivia Shultz and Albino Rivera were present. Her nieces Eri Rice and Corina Fink were in the waiting room.       [employer]

## 2018-12-19 NOTE — CONSULTS
RENAL H&P/CONSULT    Subjective:     Patient is a 80 y/o WF admitted AMS along with seizure like activity associated with profound hyponatremia. She has HTN, osteoporosis, anxiety, HLD, and recent diagnosis of CAP this past Sunday to which she was placed on both azithromycin and Levofloxacin. Since Sunday, patient also has been noted to have been drinking several sprites, water, and Gatorade. She has not been eating and stopped using salt. She was brought to the ED about 7:30pm yesterday with seizure like activity noted with moving of extremities bilaterally. No prior seizures. No ETOH abuse. Has not taken Ativan recently. She has been treated with 3% saline since last night after discussion with ED physician and sodium level improved from 107 to 112 during the night. The patient is non communicating. Past Medical History:   Diagnosis Date    CAD (coronary artery disease)     Hypertension       No past surgical history on file. Prior to Admission medications    Medication Sig Start Date End Date Taking? Authorizing Provider   atorvastatin calcium (ATORVASTATIN PO) Take  by mouth. Yes Provider, Historical   ATENOLOL PO Take  by mouth. Yes Provider, Historical   aspirin delayed-release 81 mg tablet Take 81 mg by mouth daily. Yes Provider, Historical   promethazine (PHENERGAN) 25 mg tablet Take 1 Tab by mouth every six (6) hours as needed. 5/17/17   Julian Hinkle MD   hyoscyamine (LEVSIN) 0.125 mg tablet Take 1 Tab by mouth every four (4) hours as needed for Cramping. 5/17/17   Julian Hinkle MD     Allergies   Allergen Reactions    Pcn [Penicillins] Rash      Social History     Tobacco Use    Smoking status: Never Smoker   Substance Use Topics    Alcohol use: Not on file      No family history on file.        Review of Systems    Unobtainable due to patient condition      Objective:       Visit Vitals  /58   Pulse 69   Temp 98.3 °F (36.8 °C)   Resp 18   Ht 5' 2\" (1.575 m)   Wt 59.8 kg (131 lb 13.4 oz)   SpO2 98%   BMI 24.11 kg/m²       No intake/output data recorded. 12/17 1901 - 12/19 0700  In: 410.5 [I.V.:410.5]  Out: 1500 [Urine:1500]    General:  Not communicating, no distress, appears stated age. Head:  Normocephalic, without obvious abnormality, atraumatic. Eyes:  Conjunctivae/corneas clear. EOMs intact. Ears:  Normal external ear canals both ears. Nose: Nares normal. Septum midline. Mucosa normal. No drainage or sinus tenderness. Throat: Lips, mucosa, and tongue normal. Teeth and gums normal.   Neck: Supple, symmetrical, trachea midline, no adenopathy, thyroid: no enlargement/tenderness/nodules, no JVD. Back:   Symmetric, no curvature. ROM normal. No CVA tenderness. Lungs:   Clear to auscultation bilaterally. Chest wall:  No tenderness or deformity. Heart:  Regular rate and rhythm, S1, S2 normal, no murmur,  rub or gallop. Abdomen:   Soft, non-tender. Bowel sounds normal. No masses,  No organomegaly. No renal bruit. Extremities: Extremities normal, atraumatic, no cyanosis or edema. Skin: Skin color, texture, turgor normal. No rashes or lesions. Neurologic:  No asterixis.        Data Review:     Recent Results (from the past 24 hour(s))   EKG, 12 LEAD, INITIAL    Collection Time: 12/18/18  8:27 PM   Result Value Ref Range    Ventricular Rate 73 BPM    Atrial Rate 214 BPM    QRS Duration 104 ms    Q-T Interval 370 ms    QTC Calculation (Bezet) 407 ms    Calculated R Axis 36 degrees    Calculated T Axis -67 degrees    Diagnosis       !!! Poor data quality, interpretation may be adversely affected  Undetermined rhythm :  Suspect sinus rhythm  Non-specific ST-t wave changes    Abnormal ECG  No previous ECGs available  Confirmed by ST SHARI WARD MD (), EV ARCE ([id number]) on 12/19/2018 8:41:46 AM     GLUCOSE, POC    Collection Time: 12/18/18  8:41 PM   Result Value Ref Range    Glucose (POC) 210 (H) 65 - 100 mg/dL   POC PT/INR    Collection Time: 12/18/18  8:49 PM   Result Value Ref Range    Prothrombin time (POC) 12.4 (H) 9.6 - 11.6 SECS    INR (POC) 1.0 0.9 - 1.2     CBC WITH AUTOMATED DIFF    Collection Time: 12/18/18  8:50 PM   Result Value Ref Range    WBC 14.8 (H) 4.3 - 11.1 K/uL    RBC 3.97 (L) 4.05 - 5.2 M/uL    HGB 10.7 (L) 11.7 - 15.4 g/dL    HCT 30.1 (L) 35.8 - 46.3 %    MCV 75.8 (L) 79.6 - 97.8 FL    MCH 27.0 26.1 - 32.9 PG    MCHC 35.5 (H) 31.4 - 35.0 g/dL    RDW 11.9 11.9 - 14.6 %    PLATELET 320 363 - 180 K/uL    MPV 9.9 9.4 - 12.3 FL    ABSOLUTE NRBC 0.00 0.0 - 0.2 K/uL    DF AUTOMATED      NEUTROPHILS 85 (H) 43 - 78 %    LYMPHOCYTES 8 (L) 13 - 44 %    MONOCYTES 4 4.0 - 12.0 %    EOSINOPHILS 0 (L) 0.5 - 7.8 %    BASOPHILS 0 0.0 - 2.0 %    IMMATURE GRANULOCYTES 3 0.0 - 5.0 %    ABS. NEUTROPHILS 12.6 (H) 1.7 - 8.2 K/UL    ABS. LYMPHOCYTES 1.1 0.5 - 4.6 K/UL    ABS. MONOCYTES 0.7 0.1 - 1.3 K/UL    ABS. EOSINOPHILS 0.0 0.0 - 0.8 K/UL    ABS. BASOPHILS 0.0 0.0 - 0.2 K/UL    ABS. IMM. GRANS.  0.4 0.0 - 0.5 K/UL   METABOLIC PANEL, BASIC    Collection Time: 12/18/18  8:50 PM   Result Value Ref Range    Sodium 107 (LL) 136 - 145 mmol/L    Potassium 2.9 (LL) 3.5 - 5.1 mmol/L    Chloride 70 (L) 98 - 107 mmol/L    CO2 19 (L) 21 - 32 mmol/L    Anion gap 18 (H) 7 - 16 mmol/L    Glucose 171 (H) 65 - 100 mg/dL    BUN 7 (L) 8 - 23 MG/DL    Creatinine 1.05 (H) 0.6 - 1.0 MG/DL    GFR est AA >60 >60 ml/min/1.73m2    GFR est non-AA 56 (L) >60 ml/min/1.73m2    Calcium 8.0 (L) 8.3 - 10.4 MG/DL   PTT    Collection Time: 12/18/18  8:50 PM   Result Value Ref Range    aPTT 23.9 (L) 24.7 - 39.8 SEC   MAGNESIUM    Collection Time: 12/18/18 10:40 PM   Result Value Ref Range    Magnesium 1.2 (LL) 1.8 - 2.4 mg/dL   PHOSPHORUS    Collection Time: 12/18/18 10:40 PM   Result Value Ref Range    Phosphorus 1.7 (L) 2.3 - 3.7 MG/DL   HEMOGLOBIN A1C WITH EAG    Collection Time: 12/19/18  3:00 AM   Result Value Ref Range    Hemoglobin A1c 5.6 4.8 - 6.0 %    Est. average glucose 114 mg/dL   CBC W/O DIFF Collection Time: 12/19/18  3:00 AM   Result Value Ref Range    WBC 10.5 4.3 - 11.1 K/uL    RBC 3.60 (L) 4.05 - 5.2 M/uL    HGB 9.5 (L) 11.7 - 15.4 g/dL    HCT 27.2 (L) 35.8 - 46.3 %    MCV 75.6 (L) 79.6 - 97.8 FL    MCH 26.4 26.1 - 32.9 PG    MCHC 34.9 31.4 - 35.0 g/dL    RDW 11.9 11.9 - 14.6 %    PLATELET 227 729 - 177 K/uL    MPV 9.3 (L) 9.4 - 12.3 FL    ABSOLUTE NRBC 0.00 0.0 - 0.2 K/uL   MAGNESIUM    Collection Time: 12/19/18  3:01 AM   Result Value Ref Range    Magnesium 2.0 1.8 - 2.4 mg/dL   PHOSPHORUS    Collection Time: 12/19/18  3:01 AM   Result Value Ref Range    Phosphorus 1.5 (L) 2.3 - 3.7 MG/DL   METABOLIC PANEL, BASIC    Collection Time: 12/19/18  3:01 AM   Result Value Ref Range    Sodium 112 (LL) 136 - 145 mmol/L    Potassium 2.4 (LL) 3.5 - 5.1 mmol/L    Chloride 78 (L) 98 - 107 mmol/L    CO2 24 21 - 32 mmol/L    Anion gap 10 7 - 16 mmol/L    Glucose 113 (H) 65 - 100 mg/dL    BUN 7 (L) 8 - 23 MG/DL    Creatinine 0.81 0.6 - 1.0 MG/DL    GFR est AA >60 >60 ml/min/1.73m2    GFR est non-AA >60 >60 ml/min/1.73m2    Calcium 7.2 (L) 8.3 - 10.4 MG/DL   GLUCOSE, POC    Collection Time: 12/19/18  7:33 AM   Result Value Ref Range    Glucose (POC) 95 65 - 100 mg/dL     CXR - no major infiltrate or effusion, mild CM        Principal Problem:    Hyponatremia (12/18/2018)    Active Problems:    Tonic-clonic seizure disorder (HCC) (08/65/4096)      Metabolic acidosis (30/64/8628)      HLD (hyperlipidemia) (12/19/2018)      Osteoporosis (12/19/2018)      Hypomagnesemia (12/19/2018)      Hypocalcemia (12/19/2018)      Hypokalemia (12/19/2018)        Assessment:     1.  Hyponatremia -  - probably multi-factorial, mainly \"tea and toast\" syndrome now from inadequate solute intake and excessive free water intake  - SIADH from recent pneumonia may be contributing  - she had hyponatremia in the past, obtain TSH, free T4 to evaluate for hypothyroidism  - work on gentle correction of hyponatremia with 3% saline  - rate of correction is appropriate so far    2. Hypokalemia -  - due to inadequate intake  - treat with IV potassium for now, see orders    3. Hypomagnesemia -  - improved with 1 gm magnesium sulfate    4. Hypocalcemia -  - add Vitamin D 5000 units daily    5. Anemia -  - obtain iron studies    6.  Volume status -  - clinically euvolemic          Plan:     As above, discussed with Dr. Lauri Fang M.D.

## 2018-12-19 NOTE — ED PROVIDER NOTES
33-year-old lady presents with concerns about altered mental status that started approximately 45 minutes prior to arrival.  She was sitting on the couch with her  when  Some form of altered mental status occurred with her without some form of twitching.  says that she has no history of seizures and has never had a stroke. They said that she does take aspirin intermittently, but is not on any blood thinners.  says that the patient otherwise been feeling fine, although she may have had a bronchitis last week. Elements of this note were created using speech recognition software. As such, errors of speech recognition may be present. Past Medical History:   Diagnosis Date    CAD (coronary artery disease)     Hypertension        No past surgical history on file. No family history on file. Social History     Socioeconomic History    Marital status:      Spouse name: Not on file    Number of children: Not on file    Years of education: Not on file    Highest education level: Not on file   Social Needs    Financial resource strain: Not on file    Food insecurity - worry: Not on file    Food insecurity - inability: Not on file    Transportation needs - medical: Not on file   [place] needs - non-medical: Not on file   Occupational History    Not on file   Tobacco Use    Smoking status: Never Smoker   Substance and Sexual Activity    Alcohol use: Not on file    Drug use: Not on file    Sexual activity: Not on file   Other Topics Concern    Not on file   Social History Narrative    Not on file         ALLERGIES: Pcn [penicillins]    Review of Systems   Unable to perform ROS: Mental status change       Vitals:    12/18/18 2026   BP: 137/59   Pulse: 85   Resp: 20   Temp: 97.3 °F (36.3 °C)   Weight: 56.2 kg (124 lb)   Height: 5' 2\" (1.575 m)            Physical Exam   Constitutional: She appears well-developed and well-nourished. No distress. HENT:   Head: Normocephalic and atraumatic. Eyes: Conjunctivae are normal. Pupils are equal, round, and reactive to light. Patient does not follow commands. It appears as if she does not look to the left past the midline   Neck: No JVD present. No thyromegaly present. Cardiovascular: Normal rate and normal heart sounds. No murmur heard. Pulmonary/Chest: Effort normal and breath sounds normal. She has no wheezes. She has no rales. Abdominal: She exhibits no distension and no mass. Neurological:   Patient is agitated and fidgety. She does not follow commands. She will move her right arm, left arm, and right leg. Skin: Skin is warm and dry. She is not diaphoretic. Nursing note and vitals reviewed. MDM  Number of Diagnoses or Management Options  Diagnosis management comments: 8:29 PM  Patient's clinical presentation is mixed. She was poorly sitting on the couch with her  when she then went unresponsive, possibly with some seizure activity or possibly with some posturing. EMS reports that they did not see any seizure activity on their arrival and the patient seems to have had an improvement in her mental status as their arrival.  However, during my exam.  Could not communicate and could not follow commands and had an appearance more like a stroke rather than post ictal.  Therefore, I called a code stroke. 8:48 PM  I discussed the case with the specialist on call neurologist, who reviewed the head CT scan. There is no evidence of bleeding. Patient's symptoms and presentation at this point, thought to be more consistent with seizure. So, we will treat with some Ativan and see how she responds. 9:55 PM  Patient's sodium was 107. I have contacted nephrology. I spoke with the pharmacy about getting a 3%. Sodium solution, but I need to apparently discussed the case with nephrology first.   I will attempt to place a central line for that infusion.     10:38 PM  Identical to place a central line in the right internal jugular. The patient was moving so much that it was very dangerous to attempt to stick anywhere near her carotid artery. Therefore, I placed it in her right femoral vein. I spoke with the nephrologist who advised a 500 mL 3% saline solution infused at 50 mL per hour for the first 4 hours at 30 mL per hour after that. I discussed this with the pharmacist and we were unable to directly after that and to [facility] pharmacist was going to call the nephrologist and try to figure how to order this. I will page the hospitalist for admission  11:09 PM  I spoke with the hospitalist who kindly agreed to see the patient.    ===================================================================  This patient is critically ill and there is a high probability of of imminent or life threatening deterioration in the patient's condition without immediate management. The nature of the patient's clinical problem is: Seizures, hyponatremia    I have spent 45 minutes in direct patient care, documentation, review of labs/xrays/old records, discussion with Neurologist, nephrologist, hospitalist, family . The time involved in the performance of separately reportable procedures was not counted toward critical care time. Zafar Verde MD; 12/18/2018 @11:09 PM  ===================================================================             Central Line  Date/Time: 12/18/2018 10:40 PM  Performed by: Celeste Cardenas MD  Authorized by: Celeste Cardenas MD     Consent:     Consent obtained:  Verbal    Consent given by:  Spouse    Risks discussed:  Incorrect placement, arterial puncture, infection and bleeding    Alternatives discussed:  No treatment  Pre-procedure details:     Hand hygiene: Hand hygiene performed prior to insertion      Sterile barrier technique:  All elements of maximal sterile technique followed      Skin preparation:  2% chlorhexidine    Skin preparation agent: Skin preparation agent completely dried prior to procedure    Anesthesia (see MAR for exact dosages): Anesthesia method:  Local infiltration    Local anesthetic:  Lidocaine 1% w/o epi  Procedure details:     Location:  R femoral    Site selection rationale:  Safety to patient and provider    Patient position:  Flat    Procedural supplies:  Triple lumen    Catheter size:  7 Fr    Landmarks identified: yes      Ultrasound guidance: yes      Sterile ultrasound techniques: Sterile gel and sterile probe covers were used      Number of attempts:  1    Successful placement: yes    Post-procedure details:     Post-procedure:  Dressing applied and line sutured    Assessment:  Blood return through all ports and free fluid flow    Patient tolerance of procedure:   Tolerated well, no immediate complications

## 2018-12-19 NOTE — PROGRESS NOTES
Bedside shift report given to Saeed Jiang RN. Pt is alert, oriented to person and time. VSS. 3% dual sign off as well as dual neuro assessment complete.

## 2018-12-19 NOTE — PROGRESS NOTES
Hospitalist Progress Note    2018  Admit Date: 2018  8:27 PM   NAME: Gypsy Motley   :  1950   MRN:  [id number]   Attending: Lupe Reynoso DO  PCP:  Freddy, MD Otis    SUBJECTIVE:   Mrs. Anthony Angulo is a 80 yo F admitted  with seizure likely due to hyponatremia with sodium of 107. She is seen with her daughter, Juan Kat and Bull Blakely at bedside. Mrs. Anthony Angulo reports slight headache and nausea, but is feeling better. She is A&O x3. Sodium improved to 120 and nephrology has stopped 3% saline for now. Review of Systems negative with exception of pertinent positives noted above  PHYSICAL EXAM     Visit Vitals  /54   Pulse 74   Temp 98.6 °F (37 °C)   Resp 23   Ht 5' 2\" (1.575 m)   Wt 59.8 kg (131 lb 13.4 oz)   SpO2 98%   BMI 24.11 kg/m²      Temp (24hrs), Av °F (36.7 °C), Min:97.3 °F (36.3 °C), Max:98.6 °F (37 °C)    Oxygen Therapy  O2 Sat (%): 98 % (18 1432)  Pulse via Oximetry: 73 beats per minute (18 1432)  O2 Device: Room air (18 1201)    Intake/Output Summary (Last 24 hours) at 2018 1514  Last data filed at 2018 1222  Gross per 24 hour   Intake 1277.83 ml   Output 3050 ml   Net -1772.17 ml      General: No acute distress    Lungs:  CTA Bilaterally.    Heart:  Regular rate and rhythm,  No murmur, rub, or gallop  Abdomen: Soft, Non distended, Non tender, Positive bowel sounds  Extremities: No cyanosis, clubbing or edema  Neurologic:  No focal deficits    Recent Results (from the past 24 hour(s))   EKG, 12 LEAD, INITIAL    Collection Time: 18  8:27 PM   Result Value Ref Range    Ventricular Rate 73 BPM    Atrial Rate 214 BPM    QRS Duration 104 ms    Q-T Interval 370 ms    QTC Calculation (Bezet) 407 ms    Calculated R Axis 36 degrees    Calculated T Axis -67 degrees    Diagnosis       !!! Poor data quality, interpretation may be adversely affected  Undetermined rhythm :  Suspect sinus rhythm  Non-specific ST-t wave changes    Abnormal ECG  No previous ECGs available  Confirmed by ST SHARI WARD MD (), [facility] ([id number]) on 12/19/2018 8:41:46 AM     GLUCOSE, POC    Collection Time: 12/18/18  8:41 PM   Result Value Ref Range    Glucose (POC) 210 (H) 65 - 100 mg/dL   POC PT/INR    Collection Time: 12/18/18  8:49 PM   Result Value Ref Range    Prothrombin time (POC) 12.4 (H) 9.6 - 11.6 SECS    INR (POC) 1.0 0.9 - 1.2     CBC WITH AUTOMATED DIFF    Collection Time: 12/18/18  8:50 PM   Result Value Ref Range    WBC 14.8 (H) 4.3 - 11.1 K/uL    RBC 3.97 (L) 4.05 - 5.2 M/uL    HGB 10.7 (L) 11.7 - 15.4 g/dL    HCT 30.1 (L) 35.8 - 46.3 %    MCV 75.8 (L) 79.6 - 97.8 FL    MCH 27.0 26.1 - 32.9 PG    MCHC 35.5 (H) 31.4 - 35.0 g/dL    RDW 11.9 11.9 - 14.6 %    PLATELET 022 418 - 195 K/uL    MPV 9.9 9.4 - 12.3 FL    ABSOLUTE NRBC 0.00 0.0 - 0.2 K/uL    DF AUTOMATED      NEUTROPHILS 85 (H) 43 - 78 %    LYMPHOCYTES 8 (L) 13 - 44 %    MONOCYTES 4 4.0 - 12.0 %    EOSINOPHILS 0 (L) 0.5 - 7.8 %    BASOPHILS 0 0.0 - 2.0 %    IMMATURE GRANULOCYTES 3 0.0 - 5.0 %    ABS. NEUTROPHILS 12.6 (H) 1.7 - 8.2 K/UL    ABS. LYMPHOCYTES 1.1 0.5 - 4.6 K/UL    ABS. MONOCYTES 0.7 0.1 - 1.3 K/UL    ABS. EOSINOPHILS 0.0 0.0 - 0.8 K/UL    ABS. BASOPHILS 0.0 0.0 - 0.2 K/UL    ABS. IMM. GRANS.  0.4 0.0 - 0.5 K/UL   METABOLIC PANEL, BASIC    Collection Time: 12/18/18  8:50 PM   Result Value Ref Range    Sodium 107 (LL) 136 - 145 mmol/L    Potassium 2.9 (LL) 3.5 - 5.1 mmol/L    Chloride 70 (L) 98 - 107 mmol/L    CO2 19 (L) 21 - 32 mmol/L    Anion gap 18 (H) 7 - 16 mmol/L    Glucose 171 (H) 65 - 100 mg/dL    BUN 7 (L) 8 - 23 MG/DL    Creatinine 1.05 (H) 0.6 - 1.0 MG/DL    GFR est AA >60 >60 ml/min/1.73m2    GFR est non-AA 56 (L) >60 ml/min/1.73m2    Calcium 8.0 (L) 8.3 - 10.4 MG/DL   PTT    Collection Time: 12/18/18  8:50 PM   Result Value Ref Range    aPTT 23.9 (L) 24.7 - 39.8 SEC   MAGNESIUM    Collection Time: 12/18/18 10:40 PM   Result Value Ref Range    Magnesium 1.2 (LL) 1.8 - 2.4 mg/dL PHOSPHORUS    Collection Time: 12/18/18 10:40 PM   Result Value Ref Range    Phosphorus 1.7 (L) 2.3 - 3.7 MG/DL   HEMOGLOBIN A1C WITH EAG    Collection Time: 12/19/18  3:00 AM   Result Value Ref Range    Hemoglobin A1c 5.6 4.8 - 6.0 %    Est. average glucose 114 mg/dL   CBC W/O DIFF    Collection Time: 12/19/18  3:00 AM   Result Value Ref Range    WBC 10.5 4.3 - 11.1 K/uL    RBC 3.60 (L) 4.05 - 5.2 M/uL    HGB 9.5 (L) 11.7 - 15.4 g/dL    HCT 27.2 (L) 35.8 - 46.3 %    MCV 75.6 (L) 79.6 - 97.8 FL    MCH 26.4 26.1 - 32.9 PG    MCHC 34.9 31.4 - 35.0 g/dL    RDW 11.9 11.9 - 14.6 %    PLATELET 655 729 - 191 K/uL    MPV 9.3 (L) 9.4 - 12.3 FL    ABSOLUTE NRBC 0.00 0.0 - 0.2 K/uL   MAGNESIUM    Collection Time: 12/19/18  3:01 AM   Result Value Ref Range    Magnesium 2.0 1.8 - 2.4 mg/dL   PHOSPHORUS    Collection Time: 12/19/18  3:01 AM   Result Value Ref Range    Phosphorus 1.5 (L) 2.3 - 3.7 MG/DL   METABOLIC PANEL, BASIC    Collection Time: 12/19/18  3:01 AM   Result Value Ref Range    Sodium 112 (LL) 136 - 145 mmol/L    Potassium 2.4 (LL) 3.5 - 5.1 mmol/L    Chloride 78 (L) 98 - 107 mmol/L    CO2 24 21 - 32 mmol/L    Anion gap 10 7 - 16 mmol/L    Glucose 113 (H) 65 - 100 mg/dL    BUN 7 (L) 8 - 23 MG/DL    Creatinine 0.81 0.6 - 1.0 MG/DL    GFR est AA >60 >60 ml/min/1.73m2    GFR est non-AA >60 >60 ml/min/1.73m2    Calcium 7.2 (L) 8.3 - 10.4 MG/DL   GLUCOSE, POC    Collection Time: 12/19/18  7:33 AM   Result Value Ref Range    Glucose (POC) 95 65 - 152 mg/dL   METABOLIC PANEL, BASIC    Collection Time: 12/19/18  8:59 AM   Result Value Ref Range    Sodium 120 (LL) 136 - 145 mmol/L    Potassium 3.0 (L) 3.5 - 5.1 mmol/L    Chloride 87 (L) 98 - 107 mmol/L    CO2 24 21 - 32 mmol/L    Anion gap 9 7 - 16 mmol/L    Glucose 93 65 - 100 mg/dL    BUN 8 8 - 23 MG/DL    Creatinine 0.81 0.6 - 1.0 MG/DL    GFR est AA >60 >60 ml/min/1.73m2    GFR est non-AA >60 >60 ml/min/1.73m2    Calcium 7.5 (L) 8.3 - 10.4 MG/DL   GLUCOSE, POC Collection Time: 12/19/18 12:05 PM   Result Value Ref Range    Glucose (POC) 98 65 - 100 mg/dL     Imaging:    MRI BRAIN WO CONT   Final Result   IMPRESSION:      1. Motion artifact. 2. White matter findings present as described. This pattern may be present with   chronic small vessel ischemic disease. 3. Paranasal sinus opacification. XR CHEST SNGL V   Final Result   IMPRESSION: No acute cardiopulmonary disease. Hiatal hernia unchanged. CT HEAD WO CONT   Final Result   IMPRESSION:   1. No evidence of intracranial hemorrhage. 2. Subtle small right subinsular cortex white matter hypointensity, small   infarct not excluded. Brain MRI can be considered for further evaluation if   clinically indicated. 3. Mild periventricular white matter hypointensities consistent with chronic   ischemic white matter change. ASSESSMENT      Hospital Problems as of 12/19/2018 Never Reviewed          Codes Class Noted - Resolved POA    Metabolic acidosis UJF-[id number]-HERRERA: E87.2  ICD-9-CM: 276.2  12/19/2018 - Present Unknown        HLD (hyperlipidemia) ICD-10-CM: E78.5  ICD-9-CM: 272.4  12/19/2018 - Present Unknown        Osteoporosis ICD-10-CM: M81.0  ICD-9-CM: 733.00  12/19/2018 - Present Unknown        Hypomagnesemia ICD-10-CM: E83.42  ICD-9-CM: 275.2  12/19/2018 - Present Unknown        Hypocalcemia ICD-10-CM: E83.51  ICD-9-CM: 275.41  12/19/2018 - Present Unknown        Hypokalemia ICD-10-CM: E87.6  ICD-9-CM: 276.8  12/19/2018 - Present Unknown        * (Principal) Hyponatremia ICD-10-CM: E87.1  ICD-9-CM: 276.1  12/18/2018 - Present Unknown        Tonic-clonic seizure disorder ([facility] [address] ICD-10-CM: G40.409  ICD-9-CM: 345.10  12/18/2018 - Present Unknown            Plan:  · Hyponatemia/hypokalemia- imrpoving. · Nephrology managing and has placed 3% saline on hold  · Getting IV KCl per nephro. · Check TSH/free T4.  · Obtain cortisol level in AM.    · Seizure- likely related to hyponatremia.   · Continue IV keppra for now. · EEG results pending. DVT Prophylaxis: Heparin    Signed By: Braulio Lynn.  Phoebe Hernandez MD     December 19, 2018

## 2018-12-20 LAB
ANION GAP SERPL CALC-SCNC: 10 MMOL/L (ref 7–16)
ANION GAP SERPL CALC-SCNC: 11 MMOL/L (ref 7–16)
ANION GAP SERPL CALC-SCNC: 8 MMOL/L (ref 7–16)
BUN SERPL-MCNC: 8 MG/DL (ref 8–23)
BUN SERPL-MCNC: 8 MG/DL (ref 8–23)
BUN SERPL-MCNC: 9 MG/DL (ref 8–23)
CALCIUM SERPL-MCNC: 7.5 MG/DL (ref 8.3–10.4)
CALCIUM SERPL-MCNC: 7.8 MG/DL (ref 8.3–10.4)
CALCIUM SERPL-MCNC: 7.8 MG/DL (ref 8.3–10.4)
CHLORIDE SERPL-SCNC: 100 MMOL/L (ref 98–107)
CHLORIDE SERPL-SCNC: 97 MMOL/L (ref 98–107)
CHLORIDE SERPL-SCNC: 99 MMOL/L (ref 98–107)
CO2 SERPL-SCNC: 21 MMOL/L (ref 21–32)
CO2 SERPL-SCNC: 22 MMOL/L (ref 21–32)
CO2 SERPL-SCNC: 24 MMOL/L (ref 21–32)
CORTIS AM PEAK SERPL-MCNC: 15.6 UG/DL (ref 7–25)
CREAT SERPL-MCNC: 0.95 MG/DL (ref 0.6–1)
CREAT SERPL-MCNC: 0.99 MG/DL (ref 0.6–1)
CREAT SERPL-MCNC: 1.09 MG/DL (ref 0.6–1)
GLUCOSE BLD STRIP.AUTO-MCNC: 126 MG/DL (ref 65–100)
GLUCOSE BLD STRIP.AUTO-MCNC: 138 MG/DL (ref 65–100)
GLUCOSE BLD STRIP.AUTO-MCNC: 95 MG/DL (ref 65–100)
GLUCOSE BLD STRIP.AUTO-MCNC: 96 MG/DL (ref 65–100)
GLUCOSE SERPL-MCNC: 111 MG/DL (ref 65–100)
GLUCOSE SERPL-MCNC: 121 MG/DL (ref 65–100)
GLUCOSE SERPL-MCNC: 96 MG/DL (ref 65–100)
IRON SATN MFR SERPL: 39 %
IRON SERPL-MCNC: 79 UG/DL (ref 35–150)
IRON SERPL-MCNC: 83 UG/DL (ref 35–150)
MAGNESIUM SERPL-MCNC: 2.2 MG/DL (ref 1.8–2.4)
PHOSPHATE SERPL-MCNC: 1.7 MG/DL (ref 2.3–3.7)
POTASSIUM SERPL-SCNC: 3.5 MMOL/L (ref 3.5–5.1)
POTASSIUM SERPL-SCNC: 4.4 MMOL/L (ref 3.5–5.1)
POTASSIUM SERPL-SCNC: 4.6 MMOL/L (ref 3.5–5.1)
SODIUM SERPL-SCNC: 129 MMOL/L (ref 136–145)
SODIUM SERPL-SCNC: 131 MMOL/L (ref 136–145)
SODIUM SERPL-SCNC: 132 MMOL/L (ref 136–145)
TIBC SERPL-MCNC: 215 UG/DL (ref 250–450)

## 2018-12-20 PROCEDURE — 82533 TOTAL CORTISOL: CPT

## 2018-12-20 PROCEDURE — 97110 THERAPEUTIC EXERCISES: CPT

## 2018-12-20 PROCEDURE — 83540 ASSAY OF IRON: CPT

## 2018-12-20 PROCEDURE — 74011250637 HC RX REV CODE- 250/637: Performed by: INTERNAL MEDICINE

## 2018-12-20 PROCEDURE — 80048 BASIC METABOLIC PNL TOTAL CA: CPT

## 2018-12-20 PROCEDURE — 74011250636 HC RX REV CODE- 250/636: Performed by: FAMILY MEDICINE

## 2018-12-20 PROCEDURE — 84100 ASSAY OF PHOSPHORUS: CPT

## 2018-12-20 PROCEDURE — 74011250636 HC RX REV CODE- 250/636: Performed by: INTERNAL MEDICINE

## 2018-12-20 PROCEDURE — 74011000250 HC RX REV CODE- 250: Performed by: FAMILY MEDICINE

## 2018-12-20 PROCEDURE — 82962 GLUCOSE BLOOD TEST: CPT

## 2018-12-20 PROCEDURE — 36592 COLLECT BLOOD FROM PICC: CPT

## 2018-12-20 PROCEDURE — 74011000258 HC RX REV CODE- 258: Performed by: FAMILY MEDICINE

## 2018-12-20 PROCEDURE — 65270000029 HC RM PRIVATE

## 2018-12-20 PROCEDURE — 97161 PT EVAL LOW COMPLEX 20 MIN: CPT

## 2018-12-20 PROCEDURE — 74011250637 HC RX REV CODE- 250/637: Performed by: FAMILY MEDICINE

## 2018-12-20 PROCEDURE — 83735 ASSAY OF MAGNESIUM: CPT

## 2018-12-20 RX ORDER — SODIUM,POTASSIUM PHOSPHATES 280-250MG
1 POWDER IN PACKET (EA) ORAL 4 TIMES DAILY
Status: COMPLETED | OUTPATIENT
Start: 2018-12-20 | End: 2018-12-21

## 2018-12-20 RX ORDER — POTASSIUM CHLORIDE 1.5 G/1.77G
20 POWDER, FOR SOLUTION ORAL 2 TIMES DAILY WITH MEALS
Status: COMPLETED | OUTPATIENT
Start: 2018-12-20 | End: 2018-12-21

## 2018-12-20 RX ADMIN — HEPARIN SODIUM 5000 UNITS: 5000 INJECTION INTRAVENOUS; SUBCUTANEOUS at 05:50

## 2018-12-20 RX ADMIN — HYDROCODONE BITARTRATE AND ACETAMINOPHEN 1 TABLET: 5; 325 TABLET ORAL at 11:24

## 2018-12-20 RX ADMIN — LEVOFLOXACIN 750 MG: 500 TABLET, FILM COATED ORAL at 08:58

## 2018-12-20 RX ADMIN — SODIUM CHLORIDE, PRESERVATIVE FREE 600 UNITS: 5 INJECTION INTRAVENOUS at 21:38

## 2018-12-20 RX ADMIN — Medication 20 ML: at 05:50

## 2018-12-20 RX ADMIN — Medication 20 ML: at 21:38

## 2018-12-20 RX ADMIN — SODIUM CHLORIDE 500 MG: 900 INJECTION, SOLUTION INTRAVENOUS at 01:22

## 2018-12-20 RX ADMIN — POTASSIUM & SODIUM PHOSPHATES POWDER PACK 280-160-250 MG 1 PACKET: 280-160-250 PACK at 12:10

## 2018-12-20 RX ADMIN — POTASSIUM PHOSPHATE, MONOBASIC AND POTASSIUM PHOSPHATE, DIBASIC: 224; 236 INJECTION, SOLUTION INTRAVENOUS at 05:50

## 2018-12-20 RX ADMIN — SODIUM CHLORIDE, PRESERVATIVE FREE 600 UNITS: 5 INJECTION INTRAVENOUS at 05:50

## 2018-12-20 RX ADMIN — OXYCODONE HYDROCHLORIDE AND ACETAMINOPHEN 500 MG: 500 TABLET ORAL at 08:14

## 2018-12-20 RX ADMIN — SODIUM BICARBONATE 650 MG TABLET 650 MG: at 05:50

## 2018-12-20 RX ADMIN — DEXTROSE MONOHYDRATE 50 ML/HR: 5 INJECTION, SOLUTION INTRAVENOUS at 11:33

## 2018-12-20 RX ADMIN — SODIUM BICARBONATE 650 MG TABLET 650 MG: at 11:24

## 2018-12-20 RX ADMIN — POTASSIUM CHLORIDE 20 MEQ: 1.5 POWDER, FOR SOLUTION ORAL at 17:02

## 2018-12-20 RX ADMIN — Medication 20 ML: at 13:07

## 2018-12-20 RX ADMIN — POTASSIUM & SODIUM PHOSPHATES POWDER PACK 280-160-250 MG 1 PACKET: 280-160-250 PACK at 17:02

## 2018-12-20 RX ADMIN — Medication 10 ML: at 05:51

## 2018-12-20 RX ADMIN — SODIUM BICARBONATE 650 MG TABLET 650 MG: at 18:00

## 2018-12-20 RX ADMIN — SODIUM BICARBONATE 650 MG TABLET 650 MG: at 01:22

## 2018-12-20 RX ADMIN — SODIUM CHLORIDE, PRESERVATIVE FREE 600 UNITS: 5 INJECTION INTRAVENOUS at 13:07

## 2018-12-20 RX ADMIN — POTASSIUM CHLORIDE 20 MEQ: 1.5 POWDER, FOR SOLUTION ORAL at 09:03

## 2018-12-20 RX ADMIN — Medication 10 ML: at 13:07

## 2018-12-20 RX ADMIN — VITAMIN D, TAB 1000IU (100/BT) 5000 UNITS: 25 TAB at 08:14

## 2018-12-20 RX ADMIN — POTASSIUM & SODIUM PHOSPHATES POWDER PACK 280-160-250 MG 1 PACKET: 280-160-250 PACK at 09:03

## 2018-12-20 RX ADMIN — Medication 10 ML: at 21:38

## 2018-12-20 RX ADMIN — HEPARIN SODIUM 5000 UNITS: 5000 INJECTION INTRAVENOUS; SUBCUTANEOUS at 21:34

## 2018-12-20 RX ADMIN — HEPARIN SODIUM 5000 UNITS: 5000 INJECTION INTRAVENOUS; SUBCUTANEOUS at 13:07

## 2018-12-20 RX ADMIN — POTASSIUM & SODIUM PHOSPHATES POWDER PACK 280-160-250 MG 1 PACKET: 280-160-250 PACK at 21:36

## 2018-12-20 NOTE — PROGRESS NOTES
Nutrition:   Acknowledge Nutrition Consult for Electrolyte Replacement Management. (Dr. Santiago Aguirre)   The patient presents with one acute nutrition risk factor (eating poorly due to decreased appetite), but no reported weight loss. Labs are remarkable for potassium 3.5 and phosphorus 1.7, is scheduled to receive IV potassium phosphate bolus and oral potassium and Neutra-Phos today. Nutrition Support Orders for Electrolyte Replacement Management are now activated and on the MAR. Follow-up based on standards of care. Maddie Mccrary.  Marco Antonio Luna   [phone]

## 2018-12-20 NOTE — PROGRESS NOTES
Physical Therapy Note:    Participated in interdisciplinary rounds in ICU/CCU and chart reviewed. Patient is experiencing decrease in function from baseline. Patient would benefit from skilled acute therapy to increase independence with self care/ADLs, strength, endurance, and functional mobility. Recommend PT/OT consult when medically stable and MD agrees.     Thank you for your consideration,  Marguerite Figueroa DPT

## 2018-12-20 NOTE — PROGRESS NOTES
TRANSFER - OUT REPORT:    Verbal report given to BHANU Macdonald (name) on Daniela Cannon  being transferred to Room 822 (unit) for routine progression of care       Report consisted of patients Situation, Background, Assessment and   Recommendations(SBAR). Information from the following report(s) SBAR, Kardex, ED Summary, Intake/Output, MAR, Recent Results, Med Rec Status and Cardiac Rhythm NSR was reviewed with the receiving nurse. Lines:   PICC Double Lumen 69/21/83 Right;Basilic (Active)   Central Line Being Utilized Yes 12/20/2018  8:00 AM   Criteria for Appropriate Use Limited/no vessel suitable for conventional peripheral access 12/20/2018  8:00 AM   Site Assessment Clean, dry, & intact 12/20/2018  8:00 AM   Phlebitis Assessment 0 12/20/2018  8:00 AM   Infiltration Assessment 0 12/20/2018  8:00 AM   Arm Circumference (cm) 27 cm 12/19/2018  2:48 PM   Date of Last Dressing Change 12/19/18 12/20/2018  8:00 AM   Dressing Status Clean, dry, & intact 12/20/2018  8:00 AM   Action Taken Tubing changed 12/19/2018  4:00 PM   External Catheter Length (cm) 1 centimeters 12/19/2018  2:48 PM   Dressing Type Disk with Chlorhexadine gluconate (CHG); Bacteriocidal;Transparent 12/20/2018  8:00 AM   Hub Color/Line Status Purple; Infusing 12/20/2018  8:00 AM   Positive Blood Return (Site #1) Yes 12/20/2018  8:00 AM   Hub Color/Line Status Red; Infusing 12/20/2018  8:00 AM   Positive Blood Return (Site #2) Yes 12/20/2018  8:00 AM   Alcohol Cap Used No 12/20/2018  8:00 AM        Opportunity for questions and clarification was provided. Patient transported with:   Tech      at bedside and aware of transfer to medical floor. All belongings to be sent with patient.

## 2018-12-20 NOTE — PROGRESS NOTES
Hospitalist Progress Note    2018  Admit Date: 2018  8:27 PM   NAME: Lady Suazo   :  1950   MRN:  [id number]   Attending: Nuvia John DO  PCP:  Freddy, MD Otis    SUBJECTIVE:   Mrs. Jean-Pierre Ho is a 80 yo F admitted  with seizure likely due to hyponatremia with sodium of 107. She is seen with her daughter, Kitty Brooke and Flor Ayers at bedside. Mrs. Jean-Pierre Ho reports slight headache and nausea, but is feeling better. She is A&O x3. Sodium improved to 120 and nephrology has stopped 3% saline for now. - reports feeling better overall. Slightly nauseous, but no vomiting. Sodium up to 129 and hypertonic saline stopped by nephro and now on D5 due to quick improvement in sodium. EEG yesterday showed no seizure activity. Reports slight cough (was having prior to admission) and some nasal drainage (MRI showed some paranasal sinus opacification).       Review of Systems negative with exception of pertinent positives noted above  PHYSICAL EXAM     Visit Vitals  /58   Pulse 65   Temp 98.4 °F (36.9 °C)   Resp 18   Ht 5' 2\" (1.575 m)   Wt 57.8 kg (127 lb 6.8 oz)   SpO2 98%   BMI 23.31 kg/m²      Temp (24hrs), Av.6 °F (37 °C), Min:98.4 °F (36.9 °C), Max:98.8 °F (37.1 °C)    Patient Vitals for the past 24 hrs:   Temp Pulse Resp BP SpO2   18 0602  65 18 121/58 98 %   18 0501  64 19 93/52 97 %   18 0402  62 18 (!) 89/51 97 %   18 0302  63 20 120/57 97 %   18 0206  68 16 (!) 86/48 99 %   18 0101  60 15 94/55 99 %   18 0002  61 23 (!) 88/51 96 %   18 2301  68 17 (!) 87/52 97 %   18 2201  68 15 95/50 97 %   18 2101  67 18 (!) 89/53 96 %   18 2001  69 19 (!) 89/50 96 %   18 1901 98.4 °F (36.9 °C) 67 21 107/53 95 %   18 1801  72 22 102/55 98 %   18 1701  72 20 105/55 98 %   18 1631  79 21 110/53 97 %   18 1601 98.8 °F (37.1 °C) 72 23 99/51 96 %   18 1531  74 19 108/55 97 % 12/19/18 1501  71 19 98/50 96 %   12/19/18 1432  74 23 106/54 98 %   12/19/18 1401  74 22 (!) 83/49 96 %   12/19/18 1331  70 21 95/53 96 %   12/19/18 1301  73 21 109/57 96 %   12/19/18 1231  67 23 111/56 96 %   12/19/18 1201 98.6 °F (37 °C) 66 21 107/52 98 %   12/19/18 1131  66 20 103/54 98 %   12/19/18 1101  69 18 113/53 97 %   12/19/18 1031  64 21 100/51 98 %   12/19/18 1001  73 18 100/54 98 %   12/19/18 0932  63 19 96/54 97 %   12/19/18 0901  64 19 119/57 100 %   12/19/18 0831  64 22 109/52 100 %   12/19/18 0830  70 11 119/57 99 %   12/19/18 0828  74   98 %   12/19/18 0800  69 18 116/58 98 %       Oxygen Therapy  O2 Sat (%): 98 % (12/20/18 0602)  Pulse via Oximetry: 65 beats per minute (12/20/18 0602)  O2 Device: Room air (12/19/18 1901)    Intake/Output Summary (Last 24 hours) at 12/20/2018 0736  Last data filed at 12/20/2018 0553  Gross per 24 hour   Intake 1902.83 ml   Output 3400 ml   Net -1497.17 ml      General: No acute distress, alert and oriented x 4   Lungs:  CTA Bilaterally.    Heart:  Regular rate and rhythm,  No murmur, rub, or gallop  Abdomen: Soft, Non distended, Non tender, Positive bowel sounds  Extremities: No cyanosis, clubbing or edema  Neurologic:  No focal deficits    Recent Results (from the past 24 hour(s))   METABOLIC PANEL, BASIC    Collection Time: 12/19/18  8:59 AM   Result Value Ref Range    Sodium 120 (LL) 136 - 145 mmol/L    Potassium 3.0 (L) 3.5 - 5.1 mmol/L    Chloride 87 (L) 98 - 107 mmol/L    CO2 24 21 - 32 mmol/L    Anion gap 9 7 - 16 mmol/L    Glucose 93 65 - 100 mg/dL    BUN 8 8 - 23 MG/DL    Creatinine 0.81 0.6 - 1.0 MG/DL    GFR est AA >60 >60 ml/min/1.73m2    GFR est non-AA >60 >60 ml/min/1.73m2    Calcium 7.5 (L) 8.3 - 10.4 MG/DL   GLUCOSE, POC    Collection Time: 12/19/18 12:05 PM   Result Value Ref Range    Glucose (POC) 98 65 - 901 mg/dL   METABOLIC PANEL, BASIC    Collection Time: 12/19/18  3:29 PM   Result Value Ref Range    Sodium 126 (L) 136 - 145 mmol/L    Potassium 3.8 3.5 - 5.1 mmol/L    Chloride 93 (L) 98 - 107 mmol/L    CO2 21 21 - 32 mmol/L    Anion gap 12 7 - 16 mmol/L    Glucose 93 65 - 100 mg/dL    BUN 7 (L) 8 - 23 MG/DL    Creatinine 0.95 0.6 - 1.0 MG/DL    GFR est AA >60 >60 ml/min/1.73m2    GFR est non-AA >60 >60 ml/min/1.73m2    Calcium 7.7 (L) 8.3 - 10.4 MG/DL   TSH 3RD GENERATION    Collection Time: 12/19/18  3:29 PM   Result Value Ref Range    TSH 2.880 0.358 - 3.740 uIU/mL   T4, FREE    Collection Time: 12/19/18  3:29 PM   Result Value Ref Range    T4, Free 1.3 0.78 - 1.46 NG/DL   GLUCOSE, POC    Collection Time: 12/19/18  4:34 PM   Result Value Ref Range    Glucose (POC) 102 (H) 65 - 754 mg/dL   METABOLIC PANEL, BASIC    Collection Time: 12/19/18  9:18 PM   Result Value Ref Range    Sodium 128 (L) 136 - 145 mmol/L    Potassium 4.1 3.5 - 5.1 mmol/L    Chloride 97 (L) 98 - 107 mmol/L    CO2 25 21 - 32 mmol/L    Anion gap 6 (L) 7 - 16 mmol/L    Glucose 100 65 - 100 mg/dL    BUN 8 8 - 23 MG/DL    Creatinine 0.95 0.6 - 1.0 MG/DL    GFR est AA >60 >60 ml/min/1.73m2    GFR est non-AA >60 >60 ml/min/1.73m2    Calcium 7.3 (L) 8.3 - 10.4 MG/DL   PHOSPHORUS    Collection Time: 12/19/18  9:18 PM   Result Value Ref Range    Phosphorus 1.7 (L) 2.3 - 3.7 MG/DL   GLUCOSE, POC    Collection Time: 12/19/18  9:21 PM   Result Value Ref Range    Glucose (POC) 103 (H) 65 - 100 mg/dL   MAGNESIUM    Collection Time: 12/20/18  3:16 AM   Result Value Ref Range    Magnesium 2.2 1.8 - 2.4 mg/dL   PHOSPHORUS    Collection Time: 12/20/18  3:16 AM   Result Value Ref Range    Phosphorus 1.7 (L) 2.3 - 3.7 MG/DL   METABOLIC PANEL, BASIC    Collection Time: 12/20/18  3:16 AM   Result Value Ref Range    Sodium 129 (L) 136 - 145 mmol/L    Potassium 3.5 3.5 - 5.1 mmol/L    Chloride 97 (L) 98 - 107 mmol/L    CO2 24 21 - 32 mmol/L    Anion gap 8 7 - 16 mmol/L    Glucose 96 65 - 100 mg/dL    BUN 9 8 - 23 MG/DL    Creatinine 0.95 0.6 - 1.0 MG/DL    GFR est AA >60 >60 ml/min/1.73m2    GFR est non-AA >60 >60 ml/min/1.73m2    Calcium 7.8 (L) 8.3 - 10.4 MG/DL   IRON    Collection Time: 12/20/18  3:16 AM   Result Value Ref Range    Iron 79 35 - 150 ug/dL   TRANSFERRIN SATURATION    Collection Time: 12/20/18  3:16 AM   Result Value Ref Range    Iron 83 35 - 150 ug/dL    TIBC 215 (L) 250 - 450 ug/dL    Transferrin Saturation 39 >20 %   CORTISOL, AM    Collection Time: 12/20/18  3:16 AM   Result Value Ref Range    Cortisol, a.m. 15.6 7 - 25 ug/dL     Imaging:    MRI BRAIN WO CONT   Final Result   IMPRESSION:      1. Motion artifact. 2. White matter findings present as described. This pattern may be present with   chronic small vessel ischemic disease. 3. Paranasal sinus opacification. XR CHEST SNGL V   Final Result   IMPRESSION: No acute cardiopulmonary disease. Hiatal hernia unchanged. CT HEAD WO CONT   Final Result   IMPRESSION:   1. No evidence of intracranial hemorrhage. 2. Subtle small right subinsular cortex white matter hypointensity, small   infarct not excluded. Brain MRI can be considered for further evaluation if   clinically indicated. 3. Mild periventricular white matter hypointensities consistent with chronic   ischemic white matter change.             ASSESSMENT      Hospital Problems as of 12/20/2018 Never Reviewed          Codes Class Noted - Resolved POA    Metabolic acidosis AID-52-UV: E87.2  ICD-9-CM: 276.2  12/19/2018 - Present Unknown        HLD (hyperlipidemia) ICD-10-CM: E78.5  ICD-9-CM: 272.4  12/19/2018 - Present Unknown        Osteoporosis ICD-10-CM: M81.0  ICD-9-CM: 733.00  12/19/2018 - Present Unknown        Hypomagnesemia ICD-10-CM: E83.42  ICD-9-CM: 275.2  12/19/2018 - Present Unknown        Hypocalcemia ICD-10-CM: E83.51  ICD-9-CM: 275.41  12/19/2018 - Present Unknown        Hypokalemia ICD-10-CM: E87.6  ICD-9-CM: 276.8  12/19/2018 - Present Unknown        * (Principal) Hyponatremia ICD-10-CM: E87.1  ICD-9-CM: 276.1 12/18/2018 - Present Unknown        Tonic-clonic seizure disorder ([facility] [address] ICD-10-CM: G40.409  ICD-9-CM: 345.10  12/18/2018 - Present Unknown            Plan:  · Hyponatemia/hypokalemia- improved to 129. Now on D5 due to rapid improvement. · Nephrology managing and 3% saline stopped yesterday  · Getting IV KCl per nephro. · Thyroid levels and cortisol levels normal.    · Seizure- likely related to hyponatremia. · EEG negative. · Stop IV keppra. ·     · Sinusitis (on MRI) and bronchitis- will restart levaquin and give an additional 5 days (was taking at home)    · Advance to regular diet    · Transfer to floor bed    DVT Prophylaxis: Heparin    Signed By: Loren Posada MD     December 20, 2018

## 2018-12-20 NOTE — PROGRESS NOTES
Shift assessment completed, chart reviewed. Patient is drowsy, awakens to voice, oriented x 4, mental status much improved over past 24 hours. NSR on monitor, frequent PVCs, BP stable. Breath sounds clear, room air. Continues with nasal congestion, sinus drainage, and sore throat, MD to start oral antibiotics for sinus infection. Abdomen is soft, intact, bowel sounds active, to start regular diet today, passing flatus. Reynolds cath in place, draining clear, yellow urine. Moves all extremities, pulses palpable and regular, no edema, weakness to BLE, PT/OT to be ordered today. No c/o pain at this time. VSS. NAD.

## 2018-12-20 NOTE — PROGRESS NOTES
Bedside shift change report given to Anthony Solis RN and Sanjuanita Ochoa RN (oncoming nurse) by Glenn Nicholson RN (offgoing nurse). Report included the following information SBAR, Kardex, ED Summary, Intake/Output, MAR, Recent Results, Med Rec Status, Cardiac Rhythm NSR and Alarm Parameters . Skin assessed. Patient turns self. Dual neuro assessment completed at shift change, drowsy, awakens easily to voice, weakness to BLE.

## 2018-12-20 NOTE — PROGRESS NOTES
Date of Outreach Update:  Emiliano Serrano was seen and assessed. MEWS Score: 1 (12/20/18 1500)  Vitals:    12/20/18 1301 12/20/18 1340 12/20/18 1446 12/20/18 1500   BP: 95/51 92/49  104/66   Pulse: 86 78  73   Resp: 17 18 18   Temp:  98.5 °F (36.9 °C)  98.6 °F (37 °C)   SpO2:  98% 98% 97%   Weight:       Height:             Pain Assessment  Pain Intensity 1: 0 (12/20/18 1700)  Pain Location 1: Head(sinus area)  Pain Intervention(s) 1: Medication (see MAR)  Patient Stated Pain Goal: 0      Previous Outreach assessment has been reviewed. There have been no significant clinical changes since the completion of the last dated Outreach assessment. Will continue to follow up per outreach protocol.     Signed By:   Rebekah Paris RN    December 20, 2018 6:14 PM

## 2018-12-20 NOTE — PROGRESS NOTES
TRANSFER - IN REPORT:    Verbal report received from Bertrand Bess on Lady Suazo being received from ICU for routine progression of care. Report consisted of patients Situation, Background, Assessment and Recommendations(SBAR). Information from the following report(s) SBAR, Kardex, [facility] and Recent Results was reviewed. Opportunity for questions and clarification was provided. Assessment completed upon patients arrival to unit and care assumed. Patient received to room 822. Patient connected to monitor and assessment completed. Plan of care reviewed. Patient oriented to room and call light. Admission skin assessment completed with second RN and reveals the following: Initial skin assessment completed. Skin warm, dry and intact. Sacrum assessed intact. No other skin issues noted.

## 2018-12-20 NOTE — PROGRESS NOTES
Critical Care Outreach Nurse Progress Report:    Subjective: In to assess pt secondary to transfer from ICU    MEWS Score: 1 (12/20/18 1201)    Vitals:    12/20/18 1001 12/20/18 1111 12/20/18 1201 12/20/18 1301   BP: 119/54 117/68 107/53 95/51   Pulse: 90 (!) 110 97 86   Resp: 19 18 20 17   Temp:   98.2 °F (36.8 °C)    SpO2: 97%  99%    Weight:       Height:            LAB DATA:    Recent Labs     12/20/18  1118 12/20/18  0316 12/19/18  2118  12/19/18  0301 12/18/18  2240   * 129* 128*   < > 112*  --    K 4.6 3.5 4.1   < > 2.4*  --    CL 99 97* 97*   < > 78*  --    CO2 21 24 25   < > 24  --    AGAP 11 8 6*   < > 10  --    * 96 100   < > 113*  --    BUN 8 9 8   < > 7*  --    CREA 0.99 0.95 0.95   < > 0.81  --    GFRAA >60 >60 >60   < > >60  --    GFRNA 60* >60 >60   < > >60  --    CA 7.8* 7.8* 7.3*   < > 7.2*  --    MG  --  2.2  --   --  2.0 1.2*   PHOS  --  1.7* 1.7*  --  1.5* 1.7*    < > = values in this interval not displayed. Recent Labs     12/19/18  0300 12/18/18 2050   WBC 10.5 14.8*   HGB 9.5* 10.7*   HCT 27.2* 30.1*    339        Objective: Patient in bed with family at bedside    Pain Intensity 1: 4 (12/20/18 1205)  Pain Location 1: Head(sinus area)  Pain Intervention(s) 1: Medication (see MAR)  Patient Stated Pain Goal: 0    Assessment: Patient admitted for hyponatremia (107) and seizure. Patient currently AAO x 4 on RA, w/ LCB. Patient exhausted but no complaints at this time.  Na currently 131    Plan: Follow per Outreach protocol

## 2018-12-20 NOTE — PROGRESS NOTES
RENAL PROGRESS NOTE    Subjective:     Patient is a 78 y/o WF admitted AMS along with seizure like activity associated with profound hyponatremia. She has HTN, osteoporosis, anxiety, HLD, and recent diagnosis of CAP this past Sunday to which she was placed on both azithromycin and Levofloxacin. Since Sunday, patient also has been noted to have been drinking several sprites, water, and Gatorade. She has not been eating and stopped using salt. She was brought to the ED about 7:30pm yesterday with seizure like activity noted with moving of extremities bilaterally. No prior seizures. No ETOH abuse. Has not taken Ativan recently. She has been treated with 3% saline since last night after discussion with ED physician and sodium level improved from 107 to 112 during the night. The patient is non communicating. 12/20/18 - alert and communicating well - clinically nicely improved - no dyspnea,no CP, no N/V       Past Medical History:   Diagnosis Date    CAD (coronary artery disease)     Hypertension       No past surgical history on file. Prior to Admission medications    Medication Sig Start Date End Date Taking? Authorizing Provider   atorvastatin calcium (ATORVASTATIN PO) Take  by mouth. Yes Provider, Historical   ATENOLOL PO Take  by mouth. Yes Provider, Historical   aspirin delayed-release 81 mg tablet Take 81 mg by mouth daily. Yes Provider, Historical   promethazine (PHENERGAN) 25 mg tablet Take 1 Tab by mouth every six (6) hours as needed. 5/17/17   Kashmir Noel MD   hyoscyamine (LEVSIN) 0.125 mg tablet Take 1 Tab by mouth every four (4) hours as needed for Cramping. 5/17/17   Kashmir Noel MD     Allergies   Allergen Reactions    Pcn [Penicillins] Rash      Social History     Tobacco Use    Smoking status: Never Smoker   Substance Use Topics    Alcohol use: Not on file      No family history on file.        Review of Systems    Unobtainable due to patient condition      Objective:       Visit Vitals  /53   Pulse 75   Temp 98.2 °F (36.8 °C)   Resp 17   Ht 5' 2\" (1.575 m)   Wt 57.8 kg (127 lb 6.8 oz)   SpO2 97%   BMI 23.31 kg/m²       No intake/output data recorded. 12/18 1901 - 12/20 0700  In: 2313.3 [P.O.:180; I.V.:2133.3]  Out: 4900 [Urine:4900]    General:  Communicating well, no distress, appears stated age. Head:  Normocephalic, without obvious abnormality, atraumatic. Eyes:  Conjunctivae/corneas clear. EOMs intact. Throat: Lips, mucosa, and tongue normal. Teeth and gums normal.   Back:   Symmetric, no curvature. ROM normal. No CVA tenderness. Lungs:   Clear to auscultation bilaterally. Heart:  Regular rate and rhythm, S1, S2 normal, no murmur,  rub or gallop. Abdomen:   Soft, non-tender. . No masses,  No organomegaly. Extremities: Extremities normal, atraumatic, no cyanosis or edema. Skin: Skin color, texture, turgor normal. No rashes or lesions. Neurologic:  No asterixis.        Data Review:     Recent Results (from the past 24 hour(s))   METABOLIC PANEL, BASIC    Collection Time: 12/19/18  8:59 AM   Result Value Ref Range    Sodium 120 (LL) 136 - 145 mmol/L    Potassium 3.0 (L) 3.5 - 5.1 mmol/L    Chloride 87 (L) 98 - 107 mmol/L    CO2 24 21 - 32 mmol/L    Anion gap 9 7 - 16 mmol/L    Glucose 93 65 - 100 mg/dL    BUN 8 8 - 23 MG/DL    Creatinine 0.81 0.6 - 1.0 MG/DL    GFR est AA >60 >60 ml/min/1.73m2    GFR est non-AA >60 >60 ml/min/1.73m2    Calcium 7.5 (L) 8.3 - 10.4 MG/DL   GLUCOSE, POC    Collection Time: 12/19/18 12:05 PM   Result Value Ref Range    Glucose (POC) 98 65 - 216 mg/dL   METABOLIC PANEL, BASIC    Collection Time: 12/19/18  3:29 PM   Result Value Ref Range    Sodium 126 (L) 136 - 145 mmol/L    Potassium 3.8 3.5 - 5.1 mmol/L    Chloride 93 (L) 98 - 107 mmol/L    CO2 21 21 - 32 mmol/L    Anion gap 12 7 - 16 mmol/L    Glucose 93 65 - 100 mg/dL    BUN 7 (L) 8 - 23 MG/DL    Creatinine 0.95 0.6 - 1.0 MG/DL    GFR est AA >60 >60 ml/min/1.73m2    GFR est non-AA >60 >60 ml/min/1.73m2    Calcium 7.7 (L) 8.3 - 10.4 MG/DL   TSH 3RD GENERATION    Collection Time: 12/19/18  3:29 PM   Result Value Ref Range    TSH 2.880 0.358 - 3.740 uIU/mL   T4, FREE    Collection Time: 12/19/18  3:29 PM   Result Value Ref Range    T4, Free 1.3 0.78 - 1.46 NG/DL   GLUCOSE, POC    Collection Time: 12/19/18  4:34 PM   Result Value Ref Range    Glucose (POC) 102 (H) 65 - 547 mg/dL   METABOLIC PANEL, BASIC    Collection Time: 12/19/18  9:18 PM   Result Value Ref Range    Sodium 128 (L) 136 - 145 mmol/L    Potassium 4.1 3.5 - 5.1 mmol/L    Chloride 97 (L) 98 - 107 mmol/L    CO2 25 21 - 32 mmol/L    Anion gap 6 (L) 7 - 16 mmol/L    Glucose 100 65 - 100 mg/dL    BUN 8 8 - 23 MG/DL    Creatinine 0.95 0.6 - 1.0 MG/DL    GFR est AA >60 >60 ml/min/1.73m2    GFR est non-AA >60 >60 ml/min/1.73m2    Calcium 7.3 (L) 8.3 - 10.4 MG/DL   PHOSPHORUS    Collection Time: 12/19/18  9:18 PM   Result Value Ref Range    Phosphorus 1.7 (L) 2.3 - 3.7 MG/DL   GLUCOSE, POC    Collection Time: 12/19/18  9:21 PM   Result Value Ref Range    Glucose (POC) 103 (H) 65 - 100 mg/dL   MAGNESIUM    Collection Time: 12/20/18  3:16 AM   Result Value Ref Range    Magnesium 2.2 1.8 - 2.4 mg/dL   PHOSPHORUS    Collection Time: 12/20/18  3:16 AM   Result Value Ref Range    Phosphorus 1.7 (L) 2.3 - 3.7 MG/DL   METABOLIC PANEL, BASIC    Collection Time: 12/20/18  3:16 AM   Result Value Ref Range    Sodium 129 (L) 136 - 145 mmol/L    Potassium 3.5 3.5 - 5.1 mmol/L    Chloride 97 (L) 98 - 107 mmol/L    CO2 24 21 - 32 mmol/L    Anion gap 8 7 - 16 mmol/L    Glucose 96 65 - 100 mg/dL    BUN 9 8 - 23 MG/DL    Creatinine 0.95 0.6 - 1.0 MG/DL    GFR est AA >60 >60 ml/min/1.73m2    GFR est non-AA >60 >60 ml/min/1.73m2    Calcium 7.8 (L) 8.3 - 10.4 MG/DL   IRON    Collection Time: 12/20/18  3:16 AM   Result Value Ref Range    Iron 79 35 - 150 ug/dL   TRANSFERRIN SATURATION    Collection Time: 12/20/18  3:16 AM   Result Value Ref Range    Iron 83 35 - 150 ug/dL    TIBC 215 (L) 250 - 450 ug/dL    Transferrin Saturation 39 >20 %   CORTISOL, AM    Collection Time: 12/20/18  3:16 AM   Result Value Ref Range    Cortisol, a.m. 15.6 7 - 25 ug/dL   GLUCOSE, POC    Collection Time: 12/20/18  8:10 AM   Result Value Ref Range    Glucose (POC) 95 65 - 100 mg/dL     Results for Ora Raymond" (MRN [id number]) as of 12/20/2018 08:44   Ref. Range 12/19/2018 15:29   T4, Free Latest Ref Range: 0.78 - 1.46 NG/DL 1.3   TSH Latest Ref Range: 0.358 - 3.740 uIU/mL 2.880     Results for Ora Raymond" (MRN [id number]) as of 12/20/2018 08:44   Ref. Range 12/20/2018 03:16   Cortisol, a.m. Latest Ref Range: 7 - 25 ug/dL 15.6       CXR - no major infiltrate or effusion, mild CM        Principal Problem:    Hyponatremia (12/18/2018)    Active Problems:    Tonic-clonic seizure disorder ([facility] [address] (57/50/8150)      Metabolic acidosis (53/28/7389)      HLD (hyperlipidemia) (12/19/2018)      Osteoporosis (12/19/2018)      Hypomagnesemia (12/19/2018)      Hypocalcemia (12/19/2018)      Hypokalemia (12/19/2018)        Assessment:     1. Hyponatremia -  - probably multi-factorial, mainly \"tea and toast\" syndrome now from inadequate solute intake and excessive free water intake  - SIADH from recent pneumonia may be contributing  - she had hyponatremia in the past, TSH, free T4 and cortisol were all normal   - excessively fast correction of hyponatremia with 3% saline was brought to appropriate rate of correction with D5W  - increase from 120 to 128 in last 24 hours is appropriate    2. Hypokalemia -  - due to inadequate intake  - treat with IV potassium   - improved    3. Hypomagnesemia -  - improved with 1 gm magnesium sulfate    4. Hypocalcemia -  - added Vitamin D 5000 units daily    5. Anemia -  - normal iron studies    6. Volume status -  - clinically euvolemic    7.  Hypophosphatemia -  - work on correction of phosphorus depletion PO and IV            Plan:     As above, discussed with Dr. Lala Peace M.D.

## 2018-12-20 NOTE — PROGRESS NOTES
Patient transported to room 822 via wheelchair. All belongings sent with . VSS. NAD at time of transfer.

## 2018-12-20 NOTE — PROGRESS NOTES
Problem: Mobility Impaired (Adult and Pediatric)  Goal: *Acute Goals and Plan of Care (Insert Text)  LTG:  (1.)Ms. Carter Iverson will move from supine to sit and sit to supine , scoot up and down and roll side to side in bed with INDEPENDENCE within 7 treatment day(s). (2.)Ms. Carter Iverson will transfer from bed to chair and chair to bed with MODIFIED INDEPENDENCE using the least restrictive device within 7 treatment day(s). (3.)Ms. Carter Iverson will ambulate with MODIFIED INDEPENDENCE for 500 feet with the least restrictive device within 7 treatment day(s). (4.)Ms. Carter Iverson will participate in therapeutic activity/exercises x 23 minutes for increased strength within 7 days. (5.)Ms. Carter Iverson will ascend and descend 2 stairs using R hand rail(s) with SUPERVISION to improve functional mobility and safety within 7 day(s). ________________________________________________________________________________________________      PHYSICAL THERAPY: Initial Assessment, Treatment Day: Day of Assessment, PM 12/20/2018  INPATIENT: Hospital Day: 3  [place] / Product Type: Medicare /      NAME/AGE/GENDER: Melvi Alvarez is a 79 y.o. female   PRIMARY DIAGNOSIS: Hyponatremia  Tonic-clonic seizure disorder (HCC) Hyponatremia Hyponatremia       ICD-10: Treatment Diagnosis:    · Generalized Muscle Weakness (M62.81)   Precaution/Allergies:  Pcn [penicillins]      ASSESSMENT:     Ms. Carter Iverson is a 79 y.o. female in the hospital for the above who was supine in bed upon arrival.  Pt reports that she lives in a one story house with her  that has 2 steps to enter. Pt also reported that PTA she was independent with ADLs and ambulated with independence. Pt admitted to no recent falls in the past year. Ms. Carter Iverson presents to PT with [facility] and decreased strength in B LEs. Pt has normal tone and intact sensation in B LEs.   During evaluation pt performed bed mobility with SBA-supervision and good sitting balance. She was able to stand with SBA and ambulated in leon with RW and CGA due to fair dynamic standing balance. Pt noted to have narrowed MARIAN and decreased step clearance. Ms. Sravanthi Buck could benefit from skilled PT as she is currently functioning below her baseline. In addition to evaluation pt received treatment consisting of therapeutic exercise. She performed well with visual/verbal cueing from supine position in bed. Pt tolerated well but reported some fatigue with exercise. She benefited from treatment to address decreased strength and activity tolerance. This section established at most recent assessment   PROBLEM LIST (Impairments causing functional limitations):  1. Decreased Strength  2. Decreased Ambulation Ability/Technique  3. Decreased Balance  4. Decreased Activity Tolerance   INTERVENTIONS PLANNED: (Benefits and precautions of physical therapy have been discussed with the patient.)  1. Balance Exercise  2. Bed Mobility  3. Family Education  4. Gait Training  5. Therapeutic Activites  6. Therapeutic Exercise/Strengthening  7. Transfer Training     TREATMENT PLAN: Frequency/Duration: 3 times a week for duration of hospital stay  Rehabilitation Potential For Stated Goals: Good     RECOMMENDED REHABILITATION/EQUIPMENT: (at time of discharge pending progress): Due to the probability of continued deficits (see above) this patient will not likely need continued skilled physical therapy after discharge. Equipment:    None at this time              HISTORY:   History of Present Injury/Illness (Reason for Referral):  See H&P  Past Medical History/Comorbidities:   Ms. Sravanthi Buck  has a past medical history of CAD (coronary artery disease) and Hypertension. Ms. Sravanthi Buck  has no past surgical history on file.   Social History/Living Environment:   Home Environment: Private residence  # Steps to Enter: 2  Rails to Enter: No  One/Two Story Residence: One story  Living Alone: No  Support Systems: Spouse/Significant Other/Partner  Patient Expects to be Discharged to[de-identified] Unknown  Current DME Used/Available at Home: None  Tub or Shower Type: Tub/Shower combination  Prior Level of Function/Work/Activity:  Lives in one story house with  and PTA pt was independent with ADLs and ambulation. Number of Personal Factors/Comorbidities that affect the Plan of Care: 0: LOW COMPLEXITY   EXAMINATION:   Most Recent Physical Functioning:   Gross Assessment:  AROM: Within functional limits  Strength: Generally decreased, functional(B hip flexion 4-/5)               Posture:     Balance:  Sitting: Intact  Standing: Impaired  Standing - Static: Good  Standing - Dynamic : Fair Bed Mobility:  Supine to Sit: Stand-by assistance  Sit to Supine: Supervision  Wheelchair Mobility:     Transfers:  Sit to Stand: Stand-by assistance  Stand to Sit: Stand-by assistance  Gait:     Base of Support: Narrowed  Speed/Sofy: Slow  Step Length: Left shortened;Right shortened  Gait Abnormalities: Decreased step clearance  Distance (ft): 100 Feet (ft)  Assistive Device: Walker, rolling  Ambulation - Level of Assistance: Contact guard assistance      Body Structures Involved:  1. Muscles Body Functions Affected:  1. Neuromusculoskeletal  2. Movement Related Activities and Participation Affected:  1. Mobility  2. Domestic Life  3. Community, Social and [place]   Number of elements that affect the Plan of Care: 4+: HIGH COMPLEXITY   CLINICAL PRESENTATION:   Presentation: Stable and uncomplicated: LOW COMPLEXITY   CLINICAL DECISION MAKIN [facility] Mobility Inpatient Short Form  How much difficulty does the patient currently have. .. Unable A Lot A Little None   1. Turning over in bed (including adjusting bedclothes, sheets and blankets)? [] 1   [] 2   [] 3   [x] 4   2.   Sitting down on and standing up from a chair with arms ( e.g., wheelchair, bedside commode, etc.)   [] 1 [] 2   [] 3   [x] 4   3. Moving from lying on back to sitting on the side of the bed? [] 1   [] 2   [] 3   [x] 4   How much help from another person does the patient currently need. .. Total A Lot A Little None   4. Moving to and from a bed to a chair (including a wheelchair)? [] 1   [] 2   [x] 3   [] 4   5. Need to walk in hospital room? [] 1   [] 2   [x] 3   [] 4   6. Climbing 3-5 steps with a railing? [] 1   [] 2   [x] 3   [] 4   © 2007, Trustees of [address] [place] [address] [address] [id number], under license to [employer]. All rights reserved      Score:  Initial: 21 Most Recent: X (Date: -- )    Interpretation of Tool:  Represents activities that are increasingly more difficult (i.e. Bed mobility, Transfers, Gait). Score 24 23 22-20 19-15 14-10 9-7 6     Modifier CH CI CJ CK CL CM CN      ? Mobility - Walking and Moving Around:     - CURRENT STATUS: CJ - 20%-39% impaired, limited or restricted    - GOAL STATUS: CI - 1%-19% impaired, limited or restricted    - D/C STATUS:  ---------------To be determined---------------  [place] / [place] / Product Type: Medicare /      Medical Necessity:     · Patient demonstrates good rehab potential due to higher previous functional level. Reason for Services/Other Comments:  · Patient continues to require skilled intervention due to decreased strength and balance. Use of outcome tool(s) and clinical judgement create a POC that gives a: Clear prediction of patient's progress: LOW COMPLEXITY            TREATMENT:   (In addition to Assessment/Re-Assessment sessions the following treatments were rendered)   Pre-treatment Symptoms/Complaints:  None  Pain: Initial:   Pain Intensity 1: 0  Post Session:  0     Therapeutic Exercise: (8 Minutes):  Exercises per grid below to improve strength and activity tolerance. Required minimal visual and verbal cues to promote proper body alignment and promote proper body mechanics.   Progressed repetitions and complexity of movement as indicated. Date:  12/20/18 Date:   Date:     Activity/Exercise Parameters Parameters Parameters   APs 1 x 25 B     Heel slides 1 x 20 B     Hip abd/add 1 x 20 B     SAQ 1 x 20 B     Bridges  1 x 15                       Braces/Orthotics/Lines/Etc:   · IV  · O2 Device: Room air  Treatment/Session Assessment:    · Response to Treatment:  Tolerated well without complaints other than some fatigue. · Interdisciplinary Collaboration:   o Physical Therapist  o Registered Nurse  · After treatment position/precautions:   o Supine in bed  o Bed/Chair-wheels locked  o Bed in low position  o Call light within reach  o RN notified  o Family at bedside   · Compliance with Program/Exercises: Will assess as treatment progresses  · Recommendations/Intent for next treatment session: \"Next visit will focus on advancements to more challenging activities and reduction in assistance provided\".   Total Treatment Duration:  PT Patient Time In/Time Out  Time In: 1446  Time Out: [address] [facility] Jose PT, DPT

## 2018-12-20 NOTE — PROGRESS NOTES
Patient in bed eating dinner, family at the bedside. Patient is wearing 2L O2 NC, breathing is even and unlabored. Patient has no complaints of pain, she had an occurrence of loose stools this afternoon and denies any needs at this time. Bed is in low, locked position, bed alarm is active and call light is within reach. Will continue to monitor.

## 2018-12-20 NOTE — PROGRESS NOTES
Bedside shift report received from [place], [facility]. Pt is alert, oriented x 4. VSS. Dual neuro assessment complete, full assessment in flow sheet.

## 2018-12-20 NOTE — INTERDISCIPLINARY ROUNDS
Interdisciplinary team rounds were held 12/20/2018 with the following team members:Care Management, Nursing, Nurse Practitioner, Nutrition, Palliative Care, Pastoral Care, Pharmacy, Physical Therapy, Physician, Respiratory Therapy and Clinical Coordinator. Plan of care discussed. See clinical pathway and/or care plan for interventions and desired outcomes.

## 2018-12-21 LAB
ANION GAP SERPL CALC-SCNC: 9 MMOL/L (ref 7–16)
BUN SERPL-MCNC: 7 MG/DL (ref 8–23)
BUN SERPL-MCNC: 8 MG/DL (ref 8–23)
BUN SERPL-MCNC: 8 MG/DL (ref 8–23)
CALCIUM SERPL-MCNC: 7.5 MG/DL (ref 8.3–10.4)
CALCIUM SERPL-MCNC: 7.7 MG/DL (ref 8.3–10.4)
CALCIUM SERPL-MCNC: 7.9 MG/DL (ref 8.3–10.4)
CHLORIDE SERPL-SCNC: 100 MMOL/L (ref 98–107)
CHLORIDE SERPL-SCNC: 99 MMOL/L (ref 98–107)
CHLORIDE SERPL-SCNC: 99 MMOL/L (ref 98–107)
CO2 SERPL-SCNC: 23 MMOL/L (ref 21–32)
CO2 SERPL-SCNC: 23 MMOL/L (ref 21–32)
CO2 SERPL-SCNC: 24 MMOL/L (ref 21–32)
CREAT SERPL-MCNC: 1 MG/DL (ref 0.6–1)
CREAT SERPL-MCNC: 1.04 MG/DL (ref 0.6–1)
CREAT SERPL-MCNC: 1.06 MG/DL (ref 0.6–1)
GLUCOSE BLD STRIP.AUTO-MCNC: 103 MG/DL (ref 65–100)
GLUCOSE BLD STRIP.AUTO-MCNC: 105 MG/DL (ref 65–100)
GLUCOSE BLD STRIP.AUTO-MCNC: 107 MG/DL (ref 65–100)
GLUCOSE BLD STRIP.AUTO-MCNC: 121 MG/DL (ref 65–100)
GLUCOSE SERPL-MCNC: 106 MG/DL (ref 65–100)
GLUCOSE SERPL-MCNC: 164 MG/DL (ref 65–100)
GLUCOSE SERPL-MCNC: 97 MG/DL (ref 65–100)
MAGNESIUM SERPL-MCNC: 2 MG/DL (ref 1.8–2.4)
PHOSPHATE SERPL-MCNC: 2.5 MG/DL (ref 2.3–3.7)
POTASSIUM SERPL-SCNC: 4.2 MMOL/L (ref 3.5–5.1)
POTASSIUM SERPL-SCNC: 4.3 MMOL/L (ref 3.5–5.1)
POTASSIUM SERPL-SCNC: 4.7 MMOL/L (ref 3.5–5.1)
SODIUM SERPL-SCNC: 131 MMOL/L (ref 136–145)
SODIUM SERPL-SCNC: 132 MMOL/L (ref 136–145)
SODIUM SERPL-SCNC: 132 MMOL/L (ref 136–145)

## 2018-12-21 PROCEDURE — 77030020250 HC SOL INJ D 5% LFCR 1000ML BG LF

## 2018-12-21 PROCEDURE — 84100 ASSAY OF PHOSPHORUS: CPT

## 2018-12-21 PROCEDURE — 36415 COLL VENOUS BLD VENIPUNCTURE: CPT

## 2018-12-21 PROCEDURE — 97110 THERAPEUTIC EXERCISES: CPT

## 2018-12-21 PROCEDURE — 97165 OT EVAL LOW COMPLEX 30 MIN: CPT

## 2018-12-21 PROCEDURE — 74011250637 HC RX REV CODE- 250/637: Performed by: INTERNAL MEDICINE

## 2018-12-21 PROCEDURE — 80048 BASIC METABOLIC PNL TOTAL CA: CPT

## 2018-12-21 PROCEDURE — 83735 ASSAY OF MAGNESIUM: CPT

## 2018-12-21 PROCEDURE — 82962 GLUCOSE BLOOD TEST: CPT

## 2018-12-21 PROCEDURE — 74011250636 HC RX REV CODE- 250/636: Performed by: FAMILY MEDICINE

## 2018-12-21 PROCEDURE — 94760 N-INVAS EAR/PLS OXIMETRY 1: CPT

## 2018-12-21 PROCEDURE — 74011250636 HC RX REV CODE- 250/636: Performed by: INTERNAL MEDICINE

## 2018-12-21 PROCEDURE — 74011250637 HC RX REV CODE- 250/637: Performed by: FAMILY MEDICINE

## 2018-12-21 PROCEDURE — 65270000029 HC RM PRIVATE

## 2018-12-21 RX ADMIN — POTASSIUM & SODIUM PHOSPHATES POWDER PACK 280-160-250 MG 1 PACKET: 280-160-250 PACK at 17:23

## 2018-12-21 RX ADMIN — SODIUM CHLORIDE, PRESERVATIVE FREE 600 UNITS: 5 INJECTION INTRAVENOUS at 05:41

## 2018-12-21 RX ADMIN — Medication 10 ML: at 05:42

## 2018-12-21 RX ADMIN — Medication 20 ML: at 21:19

## 2018-12-21 RX ADMIN — SODIUM CHLORIDE, PRESERVATIVE FREE 600 UNITS: 5 INJECTION INTRAVENOUS at 21:18

## 2018-12-21 RX ADMIN — Medication 10 ML: at 13:00

## 2018-12-21 RX ADMIN — SODIUM CHLORIDE, PRESERVATIVE FREE 600 UNITS: 5 INJECTION INTRAVENOUS at 12:02

## 2018-12-21 RX ADMIN — LORAZEPAM 0.5 MG: 0.5 TABLET ORAL at 18:16

## 2018-12-21 RX ADMIN — HEPARIN SODIUM 5000 UNITS: 5000 INJECTION INTRAVENOUS; SUBCUTANEOUS at 21:19

## 2018-12-21 RX ADMIN — Medication 20 ML: at 05:42

## 2018-12-21 RX ADMIN — SODIUM BICARBONATE 650 MG TABLET 650 MG: at 17:23

## 2018-12-21 RX ADMIN — POTASSIUM CHLORIDE 20 MEQ: 1.5 POWDER, FOR SOLUTION ORAL at 09:00

## 2018-12-21 RX ADMIN — POTASSIUM & SODIUM PHOSPHATES POWDER PACK 280-160-250 MG 1 PACKET: 280-160-250 PACK at 22:00

## 2018-12-21 RX ADMIN — HEPARIN SODIUM 5000 UNITS: 5000 INJECTION INTRAVENOUS; SUBCUTANEOUS at 05:39

## 2018-12-21 RX ADMIN — Medication 10 ML: at 22:00

## 2018-12-21 RX ADMIN — VITAMIN D, TAB 1000IU (100/BT) 5000 UNITS: 25 TAB at 09:00

## 2018-12-21 RX ADMIN — SODIUM BICARBONATE 650 MG TABLET 650 MG: at 00:30

## 2018-12-21 RX ADMIN — DEXTROSE MONOHYDRATE 50 ML/HR: 5 INJECTION, SOLUTION INTRAVENOUS at 05:40

## 2018-12-21 RX ADMIN — POTASSIUM & SODIUM PHOSPHATES POWDER PACK 280-160-250 MG 1 PACKET: 280-160-250 PACK at 12:00

## 2018-12-21 RX ADMIN — POTASSIUM & SODIUM PHOSPHATES POWDER PACK 280-160-250 MG 1 PACKET: 280-160-250 PACK at 09:00

## 2018-12-21 RX ADMIN — SODIUM BICARBONATE 650 MG TABLET 650 MG: at 05:38

## 2018-12-21 RX ADMIN — HEPARIN SODIUM 5000 UNITS: 5000 INJECTION INTRAVENOUS; SUBCUTANEOUS at 12:01

## 2018-12-21 RX ADMIN — OXYCODONE HYDROCHLORIDE AND ACETAMINOPHEN 500 MG: 500 TABLET ORAL at 08:59

## 2018-12-21 RX ADMIN — SODIUM BICARBONATE 650 MG TABLET 650 MG: at 12:00

## 2018-12-21 RX ADMIN — Medication 20 ML: at 13:00

## 2018-12-21 RX ADMIN — POTASSIUM CHLORIDE 20 MEQ: 1.5 POWDER, FOR SOLUTION ORAL at 17:23

## 2018-12-21 NOTE — PROGRESS NOTES
Problem: Falls - Risk of  Goal: *Absence of Falls  Document Thony Fall Risk and appropriate interventions in the flowsheet.   Outcome: Progressing Towards Goal  Fall Risk Interventions:  Mobility Interventions: Patient to call before getting OOB    Mentation Interventions: Bed/chair exit alarm    Medication Interventions: Patient to call before getting OOB    Elimination Interventions: Call light in reach

## 2018-12-21 NOTE — PROGRESS NOTES
Patient is lying in bed. Assessment completed. Respirations are even and unlabored. Patient is alert and oriented. Continues IV fluid infusing. Reynolds is intact. No distress at this time. Bed is low, locked and call light within  reach.

## 2018-12-21 NOTE — PROGRESS NOTES
Hospitalist Progress Note    Subjective:   Daily Progress Note: 12/21/2018 4:54 PM    Patient presented to ER 12/18 with altered mental status with twitching x 45 minutes prior to arrival followed by unresponsiveness per , has not been awake since seizure type activity began. EMS reported no apparent seizure history. By the time of arrival, she was responsive, but unable to follow commands. No seizure history, diagnosed with pneumonia, placed on zithromax and levaquin. Since then, she has been noticed drinking several sprites, gatorades. Otherwise no illness. Agitated and fidgety on exam. Has not taken ativan in several weeks. ER MD feels more like seizure activity. Patient unwilling or unable to look to the left past the midline. ER MD spoke with Mikhail Pettit, who interviewed patient. teleneuro recommended MRI brain, EEG, ativan, keppra. Then sodium was found at 107. Nephrology consulted, in to see patient immediately. Begun on 500 ml 3% saline solution infused at 50 ml/hr x 4 hours, then 30 ml per hour after. Also given potassium, bicarb, vitamin G for hypocalcemia, All electrolytes abnormal.  Dr Greer Luis, Nephrology in, admitted to ICU.   12/19:  Pulmonary in for leapfrog protocol. EEG complete. Continued headache and nausea, but improved. NA+: 120, 3%  Saline infusion discontinued for now. Neurology in.   12/20:  Feeling better overall. Some continued nausea. Na+ up to 129, now on D5W due to rapid Na+ correction. Transferred to floor bed. PT, OT and nutrition in.   12/21:  Anxious to go home. No further seizure activity. Dr Greer Luis discussed avoidance of free water intake.      ADDITIONAL HISTORY:  CAD, Hypertension, osteoporosis, anxiety, Hyperlipidemia, and diagnosis     Current Facility-Administered Medications   Medication Dose Route Frequency    levoFLOXacin (LEVAQUIN) tablet 750 mg  750 mg Oral Q48H    potassium, sodium phosphates (NEUTRA-PHOS) packet 1 Packet  1 Packet Oral QID    potassium chloride (KLOR-CON) packet for solution 20 mEq  20 mEq Oral BID WITH MEALS    NUTRITIONAL SUPPORT ELECTROLYTE PRN ORDERS   Does Not Apply PRN    ascorbic acid (vitamin C) (VITAMIN C) tablet 500 mg  500 mg Oral DAILY    sodium bicarbonate tablet 650 mg  650 mg Oral Q6H    insulin lispro (HUMALOG) injection   SubCUTAneous AC&HS    heparin (porcine) injection 5,000 Units  5,000 Units SubCUTAneous Q8H    cholecalciferol (VITAMIN D3) tablet 5,000 Units  5,000 Units Oral DAILY    sodium chloride (NS) flush 20 mL  20 mL InterCATHeter Q8H    heparin (porcine) pf 600 Units  600 Units InterCATHeter Q8H    sodium chloride (NS) flush 20 mL  20 mL InterCATHeter PRN    heparin (porcine) pf 600 Units  600 Units InterCATHeter PRN    dextrose 5% infusion  50 mL/hr IntraVENous CONTINUOUS    sodium chloride (NS) flush 5-10 mL  5-10 mL IntraVENous Q8H    sodium chloride (NS) flush 5-10 mL  5-10 mL IntraVENous PRN    acetaminophen (TYLENOL) tablet 650 mg  650 mg Oral Q4H PRN    HYDROcodone-acetaminophen (NORCO) 5-325 mg per tablet 1 Tab  1 Tab Oral Q4H PRN    naloxone (NARCAN) injection 0.4 mg  0.4 mg IntraVENous PRN    LORazepam (ATIVAN) tablet 0.5 mg  0.5 mg Oral Q4H PRN        Review of Systems  A comprehensive review of systems was negative except for that written in the HPI. Objective:     Visit Vitals  /55 (BP 1 Location: Left arm, BP Patient Position: At rest)   Pulse 96   Temp 98.8 °F (37.1 °C)   Resp 20   Ht 5' 2\" (1.575 m)   Wt 57 kg (125 lb 11.2 oz)   SpO2 98%   BMI 22.99 kg/m²      O2 Device: Room air    Temp (24hrs), Av.8 °F (37.1 °C), Min:98.7 °F (37.1 °C), Max:99 °F (37.2 °C)    701 - 1900  In: 240 [P.O.:240]  Out: 2500 [Urine:2500]  1901 -  0700  In: 2028.1 [P.O.:720; I.V.:1308.1]  Out: 3625 [Urine:3625]    General appearance: Oriented and alert, cooperative, family at bedside.     Head: Normocephalic, without obvious abnormality, atraumatic  Eyes: conjunctivae/corneas clear. PERRL   Throat: Lips, mucosa, and tongue normal. Teeth and gums normal  Neck: supple, symmetrical, trachea midline, no JVD  Lungs: clear to auscultation bilaterally  Heart: regular rate and rhythm, S1, S2 normal, no murmur, click, rub or gallop  Abdomen: soft, non-tender.  Bowel sounds normal. No masses,  no organomegaly  Extremities: extremities normal, atraumatic, no cyanosis or edema  Skin: Skin color, texture, turgor normal. No rashes or lesions  Neurologic: Grossly normal    Additional comments: Notes,orders, test results, vitals reviewed    Data Review  Recent Results (from the past 24 hour(s))   GLUCOSE, POC    Collection Time: 12/20/18  5:10 PM   Result Value Ref Range    Glucose (POC) 126 (H) 65 - 100 mg/dL   GLUCOSE, POC    Collection Time: 12/20/18  9:42 PM   Result Value Ref Range    Glucose (POC) 138 (H) 65 - 738 mg/dL   METABOLIC PANEL, BASIC    Collection Time: 12/21/18 12:33 AM   Result Value Ref Range    Sodium 132 (L) 136 - 145 mmol/L    Potassium 4.7 3.5 - 5.1 mmol/L    Chloride 99 98 - 107 mmol/L    CO2 24 21 - 32 mmol/L    Anion gap 9 7 - 16 mmol/L    Glucose 106 (H) 65 - 100 mg/dL    BUN 8 8 - 23 MG/DL    Creatinine 1.06 (H) 0.6 - 1.0 MG/DL    GFR est AA >60 >60 ml/min/1.73m2    GFR est non-AA 55 (L) >60 ml/min/1.73m2    Calcium 7.7 (L) 8.3 - 10.4 MG/DL   GLUCOSE, POC    Collection Time: 12/21/18  5:03 AM   Result Value Ref Range    Glucose (POC) 105 (H) 65 - 100 mg/dL   MAGNESIUM    Collection Time: 12/21/18  5:49 AM   Result Value Ref Range    Magnesium 2.0 1.8 - 2.4 mg/dL   PHOSPHORUS    Collection Time: 12/21/18  5:49 AM   Result Value Ref Range    Phosphorus 2.5 2.3 - 3.7 MG/DL   METABOLIC PANEL, BASIC    Collection Time: 12/21/18  5:49 AM   Result Value Ref Range    Sodium 131 (L) 136 - 145 mmol/L    Potassium 4.3 3.5 - 5.1 mmol/L    Chloride 99 98 - 107 mmol/L    CO2 23 21 - 32 mmol/L    Anion gap 9 7 - 16 mmol/L    Glucose 97 65 - 100 mg/dL    BUN 7 (L) 8 - 23 MG/DL    Creatinine 1.00 0.6 - 1.0 MG/DL    GFR est AA >60 >60 ml/min/1.73m2    GFR est non-AA 59 (L) >60 ml/min/1.73m2    Calcium 7.5 (L) 8.3 - 10.4 MG/DL   GLUCOSE, POC    Collection Time: 12/21/18 10:50 AM   Result Value Ref Range    Glucose (POC) 107 (H) 65 - 100 mg/dL   GLUCOSE, POC    Collection Time: 12/21/18  3:46 PM   Result Value Ref Range    Glucose (POC) 121 (H) 65 - 100 mg/dL     EEG:  Normal electroencephalogram, awake, asleep and with activation procedures. There are no focal lateralizing or epileptiform features. Increased fast activity suggests medication effect :query benzodiazepines    12/18:  CT HEAD: Mild scattered periventricular white matter hypointensities consistent with chronic ischemic white matter change. Subtle small right subinsular cortex white matter hypointensity. No evidence of intracranial mass or hemorrhage. The ventricles are normal in size and position. The basal cisterns are patent. No extra-axial fluid collection or mass effect. The orbital contents are within normal limits. Bilateral air-fluid levels within the maxillary sinuses and scattered ethmoidal air cell mucosal thickening. There  is mucosal thickening of the right greater than left sphenoid sinus. The mastoid air cells and middle ears are clear. No significant osseous or extracranial soft tissue lesions. IMPRESSION:  No evidence of intracranial hemorrhage. Subtle small right subinsular cortex white matter hypointensity, small infarct not excluded. Brain MRI can be considered for further evaluation if clinically indicated. Mild periventricular white matter hypointensities consistent with chronic Ischemic white matter change. 12/19:  CXR: A portable AP radiograph of the chest was obtained at 0037 hours. The patient is on a cardiac monitor. The lungs are clear. Needle hernia unchanged. . The bones and soft tissues are grossly within normal limits. IMPRESSION: No acute cardiopulmonary disease.   Hiatal hernia unchanged. 12/19:  MRI BRAIN:  On the T2-weighted and FLAIR sequences, there are multiple hyperintense lesions scattered throughout the supratentorial white matter. Findings are nonspecific and can be seen with chronic small vessel ischemic disease, demyelinating disease or with migraine headaches. There is subtotal opacification of the maxillary and right-sided ethmoid air cells. There is no acute infarction, acute intracranial hemorrhage, hydrocephalus or abnormal extra-axial fluid collection. IMPRESSION:  Motion artifact. White matter findings present as described. This pattern may be present with chronic small vessel ischemic disease.  Paranasal sinus opacification    Assessment/Plan:   Hyponatremia -Improving under Nephrology            Continue current plan of care  SIADH :  Recent pneumonia contributing  History of hyponatremia in the past: thyroid investigation  Hypokalemia :  Replace per Nephro and recheck Hypomagnesemia:  Replace and recheck  Hypocalcemia:  Added Vitamin D 5000 units daily  Anemia:  Iron studies complete  Euvolemic:  Correcting  Seizure activity:  EEG complete, continue keppra     Care Plan discussed with: Patient/Family and Nurse    Signed By: Bonnie Hollins NP     December 21, 2018

## 2018-12-21 NOTE — PROGRESS NOTES
Problem: Mobility Impaired (Adult and Pediatric)  Goal: *Acute Goals and Plan of Care (Insert Text)  LTG:  (1.)Ms. Bryan Sousa will move from supine to sit and sit to supine , scoot up and down and roll side to side in bed with INDEPENDENCE within 7 treatment day(s). GOAL MET 12/21/2018    (2.)Ms. Bryan Sousa will transfer from bed to chair and chair to bed with MODIFIED INDEPENDENCE using the least restrictive device within 7 treatment day(s). (3.)Ms. Bryan Sousa will ambulate with MODIFIED INDEPENDENCE for 500 feet with the least restrictive device within 7 treatment day(s). (4.)Ms. Bryan Sousa will participate in therapeutic activity/exercises x 23 minutes for increased strength within 7 days. (5.)Ms. Bryan Sousa will ascend and descend 2 stairs using R hand rail(s) with SUPERVISION to improve functional mobility and safety within 7 day(s). PHYSICAL THERAPY: Daily Note, Treatment Day: 1st, PM 12/21/2018  INPATIENT: Hospital Day: 4  [place] / Product Type: Medicare /      NAME/AGE/GENDER: Jeanne Schwartz is a 79 y.o. female   PRIMARY DIAGNOSIS: Hyponatremia  Tonic-clonic seizure disorder ([facility] [address] Hyponatremia Hyponatremia       ICD-10: Treatment Diagnosis:    · Generalized Muscle Weakness (M62.81)   Precaution/Allergies:  Pcn [penicillins]      ASSESSMENT:     Ms. Bryan Sousa is supine on arrival, agreeable to OOB activity. Pt A & O x 4, on room air, daughter present in room. Pt demo improved mobility this date, independent with bed mobility, SBA for transfers. Pt able to ambulate into hallway for 300' w/o assistive device, slightly unsteady at times, guarded in shoulders and B UEs. Upon return, pt declined exercises or OOB attempts to chair, independent returning to supine. Pt conts to demo decreased activity tolerance and states still \"feels weak\". Overall pt demo improvements with mobility and making progress toward goals. PT to cont to follow for acute care needs.       Per initial eval: Pt reports that she lives in a one story house with her  that has 2 steps to enter. Pt also reported that PTA she was independent with ADLs and ambulated with independence. Pt admitted to no recent falls in the past year. This section established at most recent assessment   PROBLEM LIST (Impairments causing functional limitations):  1. Decreased Strength  2. Decreased Ambulation Ability/Technique  3. Decreased Balance  4. Decreased Activity Tolerance   INTERVENTIONS PLANNED: (Benefits and precautions of physical therapy have been discussed with the patient.)  1. Balance Exercise  2. Bed Mobility  3. Family Education  4. Gait Training  5. Therapeutic Activites  6. Therapeutic Exercise/Strengthening  7. Transfer Training     TREATMENT PLAN: Frequency/Duration: 3 times a week for duration of hospital stay  Rehabilitation Potential For Stated Goals: Good     RECOMMENDED REHABILITATION/EQUIPMENT: (at time of discharge pending progress): Due to the probability of continued deficits (see above) this patient will not likely need continued skilled physical therapy after discharge. Equipment:    None at this time              HISTORY:   History of Present Injury/Illness (Reason for Referral):  See H&P  Past Medical History/Comorbidities:   Ms. Kemar Noalsco  has a past medical history of CAD (coronary artery disease) and Hypertension. Ms. Kemar Nolasco  has no past surgical history on file. Social History/Living Environment:   Home Environment: Private residence  # Steps to Enter: 2  Rails to Enter: No  One/Two Story Residence: One story  Living Alone: No  Support Systems: Spouse/Significant Other/Partner  Patient Expects to be Discharged to[de-identified] Unknown  Current DME Used/Available at Home: None  Tub or Shower Type: Tub/Shower combination  Prior Level of Function/Work/Activity:  Lives in one story house with  and PTA pt was independent with ADLs and ambulation.        Number of Personal Factors/Comorbidities that affect the Plan of Care: 0: LOW COMPLEXITY   EXAMINATION:   Most Recent Physical Functioning:   Gross Assessment:                  Posture:  Posture (WDL): Exceptions to WDL  Posture Assessment: Rounded shoulders  Balance:  Sitting: Intact  Standing: Impaired  Standing - Static: Good  Standing - Dynamic : Fair Bed Mobility:  Rolling: Independent  Supine to Sit: Independent  Sit to Supine: Independent  Scooting: Independent  Wheelchair Mobility:     Transfers:  Sit to Stand: Stand-by assistance  Stand to Sit: Stand-by assistance  Gait:     Base of Support: Narrowed  Speed/Sofy: Slow  Step Length: Left shortened;Right shortened  Swing Pattern: Left symmetrical;Right symmetrical  Gait Abnormalities: Decreased step clearance  Distance (ft): 300 Feet (ft)(slight unsteadiness at times)  Assistive Device: Gait belt(no AD)  Ambulation - Level of Assistance: Stand-by assistance;Contact guard assistance  Interventions: Verbal cues      Body Structures Involved:  1. Muscles Body Functions Affected:  1. Neuromusculoskeletal  2. Movement Related Activities and Participation Affected:  1. Mobility  2. Domestic Life  3. Community, Social and Glynn Bee   Number of elements that affect the Plan of Care: 4+: HIGH COMPLEXITY   CLINICAL PRESENTATION:   Presentation: Stable and uncomplicated: LOW COMPLEXITY   CLINICAL DECISION MAKIN [facility] Mobility Inpatient Short Form  How much difficulty does the patient currently have. .. Unable A Lot A Little None   1. Turning over in bed (including adjusting bedclothes, sheets and blankets)? [] 1   [] 2   [] 3   [x] 4   2. Sitting down on and standing up from a chair with arms ( e.g., wheelchair, bedside commode, etc.)   [] 1   [] 2   [] 3   [x] 4   3. Moving from lying on back to sitting on the side of the bed? [] 1   [] 2   [] 3   [x] 4   How much help from another person does the patient currently need. ..  Total A Lot A Little None   4. Moving to and from a bed to a chair (including a wheelchair)? [] 1   [] 2   [x] 3   [] 4   5. Need to walk in hospital room? [] 1   [] 2   [x] 3   [] 4   6. Climbing 3-5 steps with a railing? [] 1   [] 2   [x] 3   [] 4   © 2007, Trustees of [facility] [employer], under license to "[employer]. All rights reserved      Score:  Initial: 21 Most Recent: X (Date: -- )    Interpretation of Tool:  Represents activities that are increasingly more difficult (i.e. Bed mobility, Transfers, Gait). Score 24 23 22-20 19-15 14-10 9-7 6     Modifier CH CI CJ CK CL CM CN      ? Mobility - Walking and Moving Around:     - CURRENT STATUS: CJ - 20%-39% impaired, limited or restricted    - GOAL STATUS: CI - 1%-19% impaired, limited or restricted    - D/C STATUS:  ---------------To be determined---------------  [place] / Product Type: Medicare /      Medical Necessity:     · Patient demonstrates good rehab potential due to higher previous functional level. Reason for Services/Other Comments:  · Patient continues to require skilled intervention due to decreased strength and balance. Use of outcome tool(s) and clinical judgement create a POC that gives a: Clear prediction of patient's progress: LOW COMPLEXITY            TREATMENT:   (In addition to Assessment/Re-Assessment sessions the following treatments were rendered)   Pre-treatment Symptoms/Complaints:  \"I am doing a little better. I still feel weak though\"  Pain: Initial:   Pain Intensity 1: 0  Post Session:  0     Therapeutic Activity: (    14 min): Therapeutic activities including Bed transfers, Ambulation on level ground and weight shifting to improve mobility, strength, balance and coordination. Required minimal Verbal cues to promote static and dynamic balance in standing and promote coordination of bilateral, lower extremity(s).      Therapeutic Exercise: ( ):  Exercises per grid below to improve strength and activity tolerance. Required minimal visual and verbal cues to promote proper body alignment and promote proper body mechanics. Progressed repetitions and complexity of movement as indicated. Date:  12/20/18 Date:   Date:     Activity/Exercise Parameters Parameters Parameters   APs 1 x 25 B     Heel slides 1 x 20 B     Hip abd/add 1 x 20 B     SAQ 1 x 20 B     Bridges  1 x 15                   Braces/Orthotics/Lines/Etc:   · IV  · O2 Device: Room air  Treatment/Session Assessment:    · Response to Treatment: improved ambulation  · Interdisciplinary Collaboration:   o Physical Therapist  o Registered Nurse  · After treatment position/precautions:   o Supine in bed  o Bed/Chair-wheels locked  o Bed in low position  o Call light within reach  o RN notified  o Family at bedside   · Compliance with Program/Exercises: Will assess as treatment progresses  · Recommendations/Intent for next treatment session: \"Next visit will focus on advancements to more challenging activities and reduction in assistance provided\".   Total Treatment Duration:  PT Patient Time In/Time Out  Time In: 1200  Time Out: 73 Bijal Buckley PT

## 2018-12-21 NOTE — PROGRESS NOTES
RENAL PROGRESS NOTE    Subjective:     Patient is a 78 y/o WF admitted AMS along with seizure like activity associated with profound hyponatremia. She has HTN, osteoporosis, anxiety, HLD, and recent diagnosis of CAP this past Sunday to which she was placed on both azithromycin and Levofloxacin. Since Sunday, patient also has been noted to have been drinking several sprites, water, and Gatorade. She has not been eating and stopped using salt. She was brought to the ED about 7:30pm yesterday with seizure like activity noted with moving of extremities bilaterally. No prior seizures. No ETOH abuse. Has not taken Ativan recently. She has been treated with 3% saline since last night after discussion with ED physician and sodium level improved from 107 to 112 during the night. The patient is non communicating. 12/20/18 - alert and communicating well - clinically nicely improved - no dyspnea,no CP, no N/V   12/21/18 - recognizes me today - discussed use of Gatorade and avoidance of excessive free water intake -  at bedside - no dyspnea, no CP, no N/V       Past Medical History:   Diagnosis Date    CAD (coronary artery disease)     Hypertension       No past surgical history on file. Prior to Admission medications    Medication Sig Start Date End Date Taking? Authorizing Provider   atorvastatin calcium (ATORVASTATIN PO) Take  by mouth. Yes Provider, Historical   ATENOLOL PO Take  by mouth. Yes Provider, Historical   aspirin delayed-release 81 mg tablet Take 81 mg by mouth daily. Yes Provider, Historical   promethazine (PHENERGAN) 25 mg tablet Take 1 Tab by mouth every six (6) hours as needed.  5/17/17   Jessica James MD   hyoscyamine (LEVSIN) 0.125 mg tablet Take 1 Tab by mouth every four (4) hours as needed for Cramping. 5/17/17   Jessica James MD     Allergies   Allergen Reactions    Pcn [Penicillins] Rash      Social History     Tobacco Use    Smoking status: Never Smoker   Substance Use Topics  Alcohol use: Not on file      No family history on file. Review of Systems    Unobtainable due to patient condition      Objective:       Visit Vitals  /55 (BP 1 Location: Left arm, BP Patient Position: At rest)   Pulse 96   Temp 98.8 °F (37.1 °C)   Resp 20   Ht 5' 2\" (1.575 m)   Wt 57 kg (125 lb 11.2 oz)   SpO2 98%   BMI 22.99 kg/m²       12/21 0701 - 12/21 1900  In: 240 [P.O.:240]  Out: 2500 [Urine:2500]  12/19 1901 - 12/21 0700  In: 2028.1 [P.O.:720; I.V.:1308.1]  Out: 3625 [Urine:3625]    General:  Communicating well, no distress, appears stated age. Head:  Normocephalic, without obvious abnormality, atraumatic. Eyes:  Conjunctivae/corneas clear. EOMs intact. Throat: Lips, mucosa, and tongue normal. Teeth and gums normal.   Back:   Symmetric, no curvature. ROM normal. No CVA tenderness. Lungs:   Clear to auscultation bilaterally. Heart:  Regular rate and rhythm, S1, S2 normal, no murmur,  rub or gallop. Abdomen:   Soft, non-tender. . No masses,  No organomegaly. Extremities: Extremities normal, atraumatic, no cyanosis or edema. Skin: Skin color, texture, turgor normal. No rashes or lesions. Neurologic:  No asterixis.        Data Review:     Recent Results (from the past 24 hour(s))   METABOLIC PANEL, BASIC    Collection Time: 12/20/18  4:47 PM   Result Value Ref Range    Sodium 132 (L) 136 - 145 mmol/L    Potassium 4.4 3.5 - 5.1 mmol/L    Chloride 100 98 - 107 mmol/L    CO2 22 21 - 32 mmol/L    Anion gap 10 7 - 16 mmol/L    Glucose 121 (H) 65 - 100 mg/dL    BUN 8 8 - 23 MG/DL    Creatinine 1.09 (H) 0.6 - 1.0 MG/DL    GFR est AA >60 >60 ml/min/1.73m2    GFR est non-AA 53 (L) >60 ml/min/1.73m2    Calcium 7.5 (L) 8.3 - 10.4 MG/DL   GLUCOSE, POC    Collection Time: 12/20/18  5:10 PM   Result Value Ref Range    Glucose (POC) 126 (H) 65 - 100 mg/dL   GLUCOSE, POC    Collection Time: 12/20/18  9:42 PM   Result Value Ref Range    Glucose (POC) 138 (H) 65 - 851 mg/dL   METABOLIC PANEL, BASIC    Collection Time: 12/21/18 12:33 AM   Result Value Ref Range    Sodium 132 (L) 136 - 145 mmol/L    Potassium 4.7 3.5 - 5.1 mmol/L    Chloride 99 98 - 107 mmol/L    CO2 24 21 - 32 mmol/L    Anion gap 9 7 - 16 mmol/L    Glucose 106 (H) 65 - 100 mg/dL    BUN 8 8 - 23 MG/DL    Creatinine 1.06 (H) 0.6 - 1.0 MG/DL    GFR est AA >60 >60 ml/min/1.73m2    GFR est non-AA 55 (L) >60 ml/min/1.73m2    Calcium 7.7 (L) 8.3 - 10.4 MG/DL   GLUCOSE, POC    Collection Time: 12/21/18  5:03 AM   Result Value Ref Range    Glucose (POC) 105 (H) 65 - 100 mg/dL   MAGNESIUM    Collection Time: 12/21/18  5:49 AM   Result Value Ref Range    Magnesium 2.0 1.8 - 2.4 mg/dL   PHOSPHORUS    Collection Time: 12/21/18  5:49 AM   Result Value Ref Range    Phosphorus 2.5 2.3 - 3.7 MG/DL   METABOLIC PANEL, BASIC    Collection Time: 12/21/18  5:49 AM   Result Value Ref Range    Sodium 131 (L) 136 - 145 mmol/L    Potassium 4.3 3.5 - 5.1 mmol/L    Chloride 99 98 - 107 mmol/L    CO2 23 21 - 32 mmol/L    Anion gap 9 7 - 16 mmol/L    Glucose 97 65 - 100 mg/dL    BUN 7 (L) 8 - 23 MG/DL    Creatinine 1.00 0.6 - 1.0 MG/DL    GFR est AA >60 >60 ml/min/1.73m2    GFR est non-AA 59 (L) >60 ml/min/1.73m2    Calcium 7.5 (L) 8.3 - 10.4 MG/DL   GLUCOSE, POC    Collection Time: 12/21/18 10:50 AM   Result Value Ref Range    Glucose (POC) 107 (H) 65 - 100 mg/dL   GLUCOSE, POC    Collection Time: 12/21/18  3:46 PM   Result Value Ref Range    Glucose (POC) 121 (H) 65 - 100 mg/dL     Results for Yue Blevins" (MRN [id number]) as of 12/20/2018 08:44   Ref. Range 12/19/2018 15:29   T4, Free Latest Ref Range: 0.78 - 1.46 NG/DL 1.3   TSH Latest Ref Range: 0.358 - 3.740 uIU/mL 2.880     Results for Yue Blevins" (MRN [id number]) as of 12/20/2018 08:44   Ref. Range 12/20/2018 03:16   Cortisol, a.m.  Latest Ref Range: 7 - 25 ug/dL 15.6       CXR - no major infiltrate or effusion, mild CM        Principal Problem:    Hyponatremia (12/18/2018)    Active Problems:    Tonic-clonic seizure disorder ([facility] [address] (05/22/8815)      Metabolic acidosis (85/65/4698)      HLD (hyperlipidemia) (12/19/2018)      Osteoporosis (12/19/2018)      Hypomagnesemia (12/19/2018)      Hypocalcemia (12/19/2018)      Hypokalemia (12/19/2018)        Assessment:     1. Hyponatremia -  - probably multi-factorial, mainly \"tea and toast\" syndrome now from inadequate solute intake and excessive free water intake  - SIADH from recent pneumonia may be contributing  - she had hyponatremia in the past, TSH, free T4 and cortisol were all normal   - excessively fast correction of hyponatremia with 3% saline was brought to appropriate rate of correction with D5W  - increase from 120 to 128 in the previous 24 hours and increase from 128 to 131 in the past 24 hours is appropriate  - this pm at 135, good control of rate of correction    2. Hypokalemia -  - due to inadequate intake  - improved    3. Hypomagnesemia -  - improved with 1 gm magnesium sulfate    4. Hypocalcemia -  - added Vitamin D 5000 units daily    5. Anemia -  - normal iron studies    6. Volume status -  - clinically euvolemic    7.  Hypophosphatemia -  - improving with phosphorus repletion PO and IV            Plan:     As above     Carol Ann Frederick M.D.

## 2018-12-21 NOTE — PROGRESS NOTES
Problem: Self Care Deficits Care Plan (Adult)  Goal: *Acute Goals and Plan of Care (Insert Text)    OCCUPATIONAL THERAPY: Initial Assessment, Discharge and PM 12/21/2018  INPATIENT: Hospital Day: 4  [place] / Product Type: Medicare /      NAME/AGE/GENDER: Lillie Baca is a 79 y.o. female   PRIMARY DIAGNOSIS:  Hyponatremia  Tonic-clonic seizure disorder ([facility] [address] Hyponatremia Hyponatremia       ICD-10: Treatment Diagnosis:    · Generalized Muscle Weakness (M62.81)   Precautions/Allergies:     Pcn [penicillins]      ASSESSMENT:     Ms. More Marrufo is a 79 y.o. female admitted with the above. At baseline, pt lives with  and is independent with ADLs, IADLs, driving, and functional mobility. Upon arrival, pt is alert and agreeable to OT evaluation. General UE screen revealed AROM WFL and strength WFL in BUEs. Sensation intact. Pt completes bed mobility, toilet transfer, and tub transfer getting down into tub as if for a bath with SBA. Pt left supine in bed with call bell within reach. Pt reports she is at her baseline for ADLs. Pt is currently functioning at/near baseline for ADLs. No acute OT needs identified at this time. Will d/c. This section established at most recent assessment   PROBLEM LIST (Impairments causing functional limitations):  1. None   INTERVENTIONS PLANNED: (Benefits and precautions of occupational therapy have been discussed with the patient.)  1. NA     TREATMENT PLAN: Frequency/Duration: NA     RECOMMENDED REHABILITATION/EQUIPMENT: (at time of discharge pending progress): Due to the probability of continued deficits (see above) this patient will not likely need continued skilled occupational therapy after discharge. Equipment:    None at this time              OCCUPATIONAL PROFILE AND HISTORY:   History of Present Injury/Illness (Reason for Referral):  See H&P.      Past Medical History/Comorbidities:   Ms. More Marrufo  has a past medical history of CAD (coronary artery disease) and Hypertension. Ms. Eddie Nevarez  has no past surgical history on file. Social History/Living Environment:   Home Environment: Private residence  # Steps to Enter: 2  Rails to Enter: No  One/Two Story Residence: One story  Living Alone: No  Support Systems: Spouse/Significant Other/Partner  Patient Expects to be Discharged to[de-identified] Private residence  Current DME Used/Available at Home: None  Tub or Shower Type: Tub/Shower combination  Prior Level of Function/Work/Activity:  At baseline, pt lives with  and is independent with ADLs, IADLs, driving, and functional mobility. Dominant Side:         RIGHT  Personal Factors:          Sex:  female        Age:  79 y.o. Number of Personal Factors/Comorbidities that affect the Plan of Care: Brief history (0):  LOW COMPLEXITY   ASSESSMENT OF OCCUPATIONAL PERFORMANCE[de-identified]   Activities of Daily Living:   Basic ADLs (From Assessment) Complex ADLs (From Assessment)   Feeding: Independent  Oral Facial Hygiene/Grooming: Independent  Bathing: Independent  Upper Body Dressing: Independent  Lower Body Dressing: Independent  Toileting: Independent Instrumental ADL  Meal Preparation: Minimum assistance  Homemaking: Moderate assistance   Grooming/Bathing/Dressing Activities of Daily Living     Cognitive Retraining  Safety/Judgement: Awareness of environment; Fall prevention; Insight into deficits                 Functional Transfers  Bathroom Mobility: Stand-by assistance  Toilet Transfer : Stand-by assistance  Tub Transfer: Stand-by assistance     Bed/Mat Mobility  Rolling: Independent  Supine to Sit: Independent  Sit to Supine: Independent  Sit to Stand: Stand-by assistance  Scooting: Independent       Most Recent Physical Functioning:   Gross Assessment:  AROM: Within functional limits  Strength:  Within functional limits  Coordination: Within functional limits  Sensation: Intact               Posture:  Posture (WDL): Exceptions to WDL  Posture Assessment: Rounded shoulders  Balance:  Sitting: Intact  Standing: Intact  Standing - Static: Good  Standing - Dynamic : Fair Bed Mobility:  Rolling: Independent  Supine to Sit: Independent  Sit to Supine: Independent  Scooting: Independent  Wheelchair Mobility:     Transfers:  Sit to Stand: Stand-by assistance  Stand to Sit: Stand-by assistance            Patient Vitals for the past 6 hrs:   BP BP Patient Position SpO2 Pulse   12/21/18 1056 105/64 At rest 98 % 86       Mental Status  Neurologic State: Alert  Orientation Level: Oriented X4  Cognition: Appropriate decision making, Appropriate for age attention/concentration, Appropriate safety awareness, Follows commands  Perception: Appears intact  Perseveration: No perseveration noted  Safety/Judgement: Awareness of environment, Fall prevention, Insight into deficits                          Physical Skills Involved:  1. None Cognitive Skills Affected (resulting in the inability to perform in a timely and safe manner):  1. None Psychosocial Skills Affected:  1. Habits/Routines  2. Self-Awareness   Number of elements that affect the Plan of Care: 1-3:  LOW COMPLEXITY   CLINICAL DECISION MAKING:   [facility] [employer] AM-PAC 6 Clicks   Daily Activity Inpatient Short Form  How much help from another person does the patient currently need. .. Total A Lot A Little None   1. Putting on and taking off regular lower body clothing? [] 1   [] 2   [] 3   [x] 4   2. Bathing (including washing, rinsing, drying)? [] 1   [] 2   [] 3   [x] 4   3. Toileting, which includes using toilet, bedpan or urinal?   [] 1   [] 2   [] 3   [x] 4   4. Putting on and taking off regular upper body clothing? [] 1   [] 2   [] 3   [x] 4   5. Taking care of personal grooming such as brushing teeth? [] 1   [] 2   [] 3   [x] 4   6. Eating meals? [] 1   [] 2   [] 3   [x] 4   © 2007, Trustees of [facility] [employer], under license to [employer].  All rights reserved      Score:  Initial: 24 12/21/2018  Most Recent: X (Date: -- )    Interpretation of Tool:  Represents activities that are increasingly more difficult (i.e. Bed mobility, Transfers, Gait). Score 24 23 22-20 19-15 14-10 9-7 6     Modifier CH CI CJ CK CL CM CN      ? Self Care:     - CURRENT STATUS: CH - 0% impaired, limited or restricted    - GOAL STATUS: CH - 0% impaired, limited or restricted    - D/C STATUS:  CH - 0% impaired, limited or restricted  [place] / Product Type: Medicare /      Medical Necessity:     · NA. Reason for Services/Other Comments:  · NA.    Use of outcome tool(s) and clinical judgement create a POC that gives a: LOW COMPLEXITY         TREATMENT:   (In addition to Assessment/Re-Assessment sessions the following treatments were rendered)     Pre-treatment Symptoms/Complaints:    Pain: Initial:   Pain Intensity 1: 0  Post Session:  same     Assessment/Reassessment only, no treatment provided today    Braces/Orthotics/Lines/Etc:   · O2 Device: Room air  Treatment/Session Assessment:    · Response to Treatment:  Assessment only  · Interdisciplinary Collaboration:   o Occupational Therapist  · After treatment position/precautions:   o Supine in bed  o Bed/Chair-wheels locked  o Bed in low position  o Call light within reach  o Family at bedside   · Compliance with Program/Exercises: NA.  · Recommendations/Intent for next treatment session:  NA  Total Treatment Duration:  OT Patient Time In/Time Out  Time In: 2717  Time Out: 2480 [facility] OTR/L

## 2018-12-21 NOTE — PROGRESS NOTES
Pt is in room alert and oriented. rr are even and unlabored lung sounds are diminished no distress noted. On room air. abd is soft bowel sounds are active no increased bleeding or bruising noted. Family at bedside. PICC line in place dressing clean dry and intact. Pt has dressing to inner  Rt groin from previous access line. Pt has no s/s of infection noted. Skin intact. Safety measures in place will continue to monitor.

## 2018-12-21 NOTE — PROGRESS NOTES
Pt is in room alert and oriented. rr are even an unlabored. Family at bedside. Pt has no c/o pain. Will continue to monitor.

## 2018-12-22 VITALS
SYSTOLIC BLOOD PRESSURE: 131 MMHG | OXYGEN SATURATION: 97 % | HEART RATE: 116 BPM | WEIGHT: 119.7 LBS | TEMPERATURE: 98.2 F | BODY MASS INDEX: 22.03 KG/M2 | HEIGHT: 62 IN | RESPIRATION RATE: 20 BRPM | DIASTOLIC BLOOD PRESSURE: 82 MMHG

## 2018-12-22 LAB
ANION GAP SERPL CALC-SCNC: 12 MMOL/L (ref 7–16)
BUN SERPL-MCNC: 7 MG/DL (ref 8–23)
CALCIUM SERPL-MCNC: 8.3 MG/DL (ref 8.3–10.4)
CHLORIDE SERPL-SCNC: 98 MMOL/L (ref 98–107)
CO2 SERPL-SCNC: 22 MMOL/L (ref 21–32)
CREAT SERPL-MCNC: 0.99 MG/DL (ref 0.6–1)
GLUCOSE BLD STRIP.AUTO-MCNC: 91 MG/DL (ref 65–100)
GLUCOSE BLD STRIP.AUTO-MCNC: 92 MG/DL (ref 65–100)
GLUCOSE SERPL-MCNC: 98 MG/DL (ref 65–100)
POTASSIUM SERPL-SCNC: 4.1 MMOL/L (ref 3.5–5.1)
SODIUM SERPL-SCNC: 132 MMOL/L (ref 136–145)

## 2018-12-22 PROCEDURE — 74011250636 HC RX REV CODE- 250/636: Performed by: INTERNAL MEDICINE

## 2018-12-22 PROCEDURE — 74011250636 HC RX REV CODE- 250/636: Performed by: FAMILY MEDICINE

## 2018-12-22 PROCEDURE — 82962 GLUCOSE BLOOD TEST: CPT

## 2018-12-22 PROCEDURE — 36592 COLLECT BLOOD FROM PICC: CPT

## 2018-12-22 PROCEDURE — 77030020250 HC SOL INJ D 5% LFCR 1000ML BG LF

## 2018-12-22 PROCEDURE — 74011250637 HC RX REV CODE- 250/637: Performed by: FAMILY MEDICINE

## 2018-12-22 PROCEDURE — 74011250636 HC RX REV CODE- 250/636: Performed by: NURSE PRACTITIONER

## 2018-12-22 PROCEDURE — 74011250637 HC RX REV CODE- 250/637: Performed by: INTERNAL MEDICINE

## 2018-12-22 PROCEDURE — 80048 BASIC METABOLIC PNL TOTAL CA: CPT

## 2018-12-22 RX ORDER — ONDANSETRON 2 MG/ML
4 INJECTION INTRAMUSCULAR; INTRAVENOUS ONCE
Status: COMPLETED | OUTPATIENT
Start: 2018-12-22 | End: 2018-12-22

## 2018-12-22 RX ADMIN — SODIUM CHLORIDE, PRESERVATIVE FREE 600 UNITS: 5 INJECTION INTRAVENOUS at 06:05

## 2018-12-22 RX ADMIN — LEVOFLOXACIN 750 MG: 500 TABLET, FILM COATED ORAL at 08:28

## 2018-12-22 RX ADMIN — DEXTROSE MONOHYDRATE 50 ML/HR: 5 INJECTION, SOLUTION INTRAVENOUS at 01:06

## 2018-12-22 RX ADMIN — SODIUM BICARBONATE 650 MG TABLET 650 MG: at 06:05

## 2018-12-22 RX ADMIN — SODIUM BICARBONATE 650 MG TABLET 650 MG: at 00:35

## 2018-12-22 RX ADMIN — ONDANSETRON 4 MG: 2 INJECTION INTRAMUSCULAR; INTRAVENOUS at 10:24

## 2018-12-22 RX ADMIN — OXYCODONE HYDROCHLORIDE AND ACETAMINOPHEN 500 MG: 500 TABLET ORAL at 08:28

## 2018-12-22 RX ADMIN — HEPARIN SODIUM 5000 UNITS: 5000 INJECTION INTRAVENOUS; SUBCUTANEOUS at 06:05

## 2018-12-22 RX ADMIN — Medication 10 ML: at 06:06

## 2018-12-22 RX ADMIN — VITAMIN D, TAB 1000IU (100/BT) 5000 UNITS: 25 TAB at 08:28

## 2018-12-22 RX ADMIN — Medication 20 ML: at 06:04

## 2018-12-22 NOTE — PROGRESS NOTES
Received SBAR report from Linnette Mortimer, [facility], pt is stable.

## 2018-12-22 NOTE — PROGRESS NOTES
Gave discharge instructions to the pt. The pt voiced a clear understanding. No lines to remove. Pt ready for transport to the  area.

## 2018-12-22 NOTE — PROGRESS NOTES
1127-Pt's chart accessed to assess for oncology Dr. Jimmy Mace unaware if seeing Dr. Refugio Bridges.

## 2018-12-22 NOTE — PROGRESS NOTES
CM met with patient / spouse to discuss plan. Patient states he (PCP) is Dr. Christy Hawkins on [address]   Patient not active with [facility]) and not interested in LONG TERM ACUTE CARE [facility]). Made contact with [facility] and patient isn't active at this present time. Patient will be d/c home with no needs identified. Care Management Interventions  PCP Verified by CM: Yes(Iban Francois per spouse)  Mode of Transport at Discharge:  Other (see comment)  Transition of Care Consult (CM Consult): Discharge Planning  Discharge Durable Medical Equipment: (none currently)  Current Support Network: Lives with Spouse, Own Home(pt lives with spouse, Allen Aguayo -[phone], has son and daughter that live close by.)  Confirm Follow Up Transport: Self(driving self  prior to admission)  Plan discussed with Pt/Family/Caregiver: Yes  Freedom of Choice Offered: Yes  [employer] Information Provided?: (confirms MCR/ no supplemental - able to get rx with assist)  Discharge Location  Discharge Placement: Home

## 2018-12-22 NOTE — PROGRESS NOTES
Pt is stable. Alert and oriented x4, calm and cooperative. Denies any pain. Heart sounds regular, breath sounds clear.  is at bedside. Pt walked around hallway this AM, and reported being slightly SOB after walking with . Has a PICC double lumen in the right basilic, site is clean, dry, and intact. Both lumens flushed. Dextrose 5% at 50 mL/hr discontinued this AM. Pt complained of nausea, one time Zofran ordered via MD. Received word the pt should be discharged today.

## 2018-12-22 NOTE — PROGRESS NOTES
RENAL PROGRESS NOTE    Subjective:     Patient is a 78 y/o WF admitted AMS along with seizure like activity associated with profound hyponatremia. She has HTN, osteoporosis, anxiety, HLD, and recent diagnosis of CAP this past Sunday to which she was placed on both azithromycin and Levofloxacin. Since Sunday, patient also has been noted to have been drinking several sprites, water, and Gatorade. She has not been eating and stopped using salt. She was brought to the ED about 7:30pm yesterday with seizure like activity noted with moving of extremities bilaterally. No prior seizures. No ETOH abuse. Has not taken Ativan recently. She has been treated with 3% saline since last night after discussion with ED physician and sodium level improved from 107 to 112 during the night. The patient is non communicating. 12/20/18 - alert and communicating well - clinically nicely improved - no dyspnea,no CP, no N/V   12/21/18 - recognizes me today - discussed use of Gatorade and avoidance of excessive free water intake -  at bedside - no dyspnea, no CP, no N/V   12/22/18 - resting comfortably - AMS fully resolved - asymptomatic      Past Medical History:   Diagnosis Date    CAD (coronary artery disease)     Hypertension       No past surgical history on file. Prior to Admission medications    Medication Sig Start Date End Date Taking? Authorizing Provider   atorvastatin calcium (ATORVASTATIN PO) Take  by mouth. Yes Provider, Historical   ATENOLOL PO Take  by mouth. Yes Provider, Historical   aspirin delayed-release 81 mg tablet Take 81 mg by mouth daily. Yes Provider, Historical   promethazine (PHENERGAN) 25 mg tablet Take 1 Tab by mouth every six (6) hours as needed.  5/17/17   Cristina Ramirez MD   hyoscyamine (LEVSIN) 0.125 mg tablet Take 1 Tab by mouth every four (4) hours as needed for Cramping. 5/17/17   Cristina Ramirez MD     Allergies   Allergen Reactions    Pcn [Penicillins] Rash      Social History Tobacco Use    Smoking status: Never Smoker   Substance Use Topics    Alcohol use: Not on file      No family history on file. Review of Systems    Unobtainable due to patient condition      Objective:       Visit Vitals  /80 (BP 1 Location: Left arm, BP Patient Position: At rest)   Pulse 88   Temp 98 °F (36.7 °C)   Resp 20   Ht 5' 2\" (1.575 m)   Wt 54.3 kg (119 lb 11.2 oz)   SpO2 99%   BMI 21.89 kg/m²       No intake/output data recorded. 12/20 1901 - 12/22 0700  In: 360 [P.O.:360]  Out: 4000 [Urine:4000]    General:  Comfortably resting, no distress, appears stated age. Head:  Normocephalic, without obvious abnormality, atraumatic. Eyes:  Conjunctivae/corneas clear. EOMs intact. Throat: Lips, mucosa, and tongue normal. Teeth and gums normal.   Back:   Symmetric, no curvature. ROM normal. No CVA tenderness. Lungs:   Clear to auscultation bilaterally. Heart:  Regular rate and rhythm, S1, S2 normal, no murmur,  rub or gallop. Abdomen:   Soft, non-tender. . No masses,  No organomegaly. Extremities: Extremities normal, atraumatic, no cyanosis or edema. Skin: Skin color, texture, turgor normal. No rashes or lesions. Neurologic:  No asterixis. Data Review:     Results for Beth Bazan" (MRN [id number]) as of 12/22/2018 07:53   Ref.  Range 12/20/2018 16:47 12/21/2018 00:33 12/21/2018 05:49 12/21/2018 21:18 12/22/2018 06:17   Sodium Latest Ref Range: 136 - 145 mmol/L 132 (L) 132 (L) 131 (L) 132 (L) 132 (L)   Potassium Latest Ref Range: 3.5 - 5.1 mmol/L 4.4 4.7 4.3 4.2 4.1   Chloride Latest Ref Range: 98 - 107 mmol/L 100 99 99 100 98   CO2 Latest Ref Range: 21 - 32 mmol/L 22 24 23 23 22   Anion gap Latest Ref Range: 7 - 16 mmol/L 10 9 9 9 12   Glucose Latest Ref Range: 65 - 100 mg/dL 121 (H) 106 (H) 97 164 (H) 98   BUN Latest Ref Range: 8 - 23 MG/DL 8 8 7 (L) 8 7 (L)   Creatinine Latest Ref Range: 0.6 - 1.0 MG/DL 1.09 (H) 1.06 (H) 1.00 1.04 (H) 0.99   Calcium Latest Ref Range: 8.3 - 10.4 MG/DL 7.5 (L) 7.7 (L) 7.5 (L) 7.9 (L) 8.3   Phosphorus Latest Ref Range: 2.3 - 3.7 MG/DL   2.5     Magnesium Latest Ref Range: 1.8 - 2.4 mg/dL   2.0     GFR est non-AA Latest Ref Range: >60 ml/min/1.73m2 53 (L) 55 (L) 59 (L) 56 (L) 60 (L)   GFR est AA Latest Ref Range: >60 ml/min/1.73m2 >60 >60 >60 >60 >60     Recent Results (from the past 24 hour(s))   GLUCOSE, POC    Collection Time: 12/21/18 10:50 AM   Result Value Ref Range    Glucose (POC) 107 (H) 65 - 100 mg/dL   GLUCOSE, POC    Collection Time: 12/21/18  3:46 PM   Result Value Ref Range    Glucose (POC) 121 (H) 65 - 100 mg/dL   GLUCOSE, POC    Collection Time: 12/21/18  8:09 PM   Result Value Ref Range    Glucose (POC) 103 (H) 65 - 068 mg/dL   METABOLIC PANEL, BASIC    Collection Time: 12/21/18  9:18 PM   Result Value Ref Range    Sodium 132 (L) 136 - 145 mmol/L    Potassium 4.2 3.5 - 5.1 mmol/L    Chloride 100 98 - 107 mmol/L    CO2 23 21 - 32 mmol/L    Anion gap 9 7 - 16 mmol/L    Glucose 164 (H) 65 - 100 mg/dL    BUN 8 8 - 23 MG/DL    Creatinine 1.04 (H) 0.6 - 1.0 MG/DL    GFR est AA >60 >60 ml/min/1.73m2    GFR est non-AA 56 (L) >60 ml/min/1.73m2    Calcium 7.9 (L) 8.3 - 10.4 MG/DL   GLUCOSE, POC    Collection Time: 12/22/18  5:26 AM   Result Value Ref Range    Glucose (POC) 91 65 - 511 mg/dL   METABOLIC PANEL, BASIC    Collection Time: 12/22/18  6:17 AM   Result Value Ref Range    Sodium 132 (L) 136 - 145 mmol/L    Potassium 4.1 3.5 - 5.1 mmol/L    Chloride 98 98 - 107 mmol/L    CO2 22 21 - 32 mmol/L    Anion gap 12 7 - 16 mmol/L    Glucose 98 65 - 100 mg/dL    BUN 7 (L) 8 - 23 MG/DL    Creatinine 0.99 0.6 - 1.0 MG/DL    GFR est AA >60 >60 ml/min/1.73m2    GFR est non-AA 60 (L) >60 ml/min/1.73m2    Calcium 8.3 8.3 - 10.4 MG/DL     Results for Moiz Davis" (MRN [id number]) as of 12/20/2018 08:44   Ref.  Range 12/19/2018 15:29   T4, Free Latest Ref Range: 0.78 - 1.46 NG/DL 1.3   TSH Latest Ref Range: 0.358 - 3.740 uIU/mL 2.880     Results for Darian Eli" (MRN [id number]) as of 12/20/2018 08:44   Ref. Range 12/20/2018 03:16   Cortisol, a.m. Latest Ref Range: 7 - 25 ug/dL 15.6       CXR - no major infiltrate or effusion, mild CM        Principal Problem:    Hyponatremia (12/18/2018)    Active Problems:    Tonic-clonic seizure disorder ([address] (08/00/7767)      Metabolic acidosis (84/26/5137)      HLD (hyperlipidemia) (12/19/2018)      Osteoporosis (12/19/2018)      Hypomagnesemia (12/19/2018)      Hypocalcemia (12/19/2018)      Hypokalemia (12/19/2018)        Assessment:     1. Hyponatremia -  - probably multi-factorial, mainly \"tea and toast\" syndrome now from inadequate solute intake and excessive free water intake  - SIADH from recent pneumonia may be contributing  - she had hyponatremia in the past, TSH, free T4 and cortisol were all normal   - excessively fast correction of hyponatremia with 3% saline was brought to appropriate rate of correction with D5W  - increase from 120 to 128 in the previous 24 hours and increase from 128 to 131 in the past 24 hours is appropriate  - this am at 132, good control of rate of correction  - stop q 6H BMPs and stop D5W    2. Hypokalemia -  - due to inadequate intake  - improved    3. Hypomagnesemia -  - improved with 1 gm magnesium sulfate    4. Hypocalcemia -  - added Vitamin D 5000 units daily    5. Anemia -  - normal iron studies    6. Volume status -  - clinically euvolemic    7.  Hypophosphatemia -  - improving with phosphorus repletion PO and IV            Plan:     As above     Marina Contreras M.D.

## 2018-12-22 NOTE — PROGRESS NOTES
Received bedside shift report from Annamaria Valdes RN. Pt lying in bed. Spouse at bedside. No apparent distress. Respirations even and unlabored. Instructed to call for assistance with needs, as they arise. Pt voiced understanding.

## 2018-12-22 NOTE — PROGRESS NOTES
Pt discharged home via private car. She is alert and oriented rr are even and unlabored. Pt was given all discharge instructions and follow up appointments. She verbalized understanding of all instructions. PICC line was removed. Pressure applied to area for 3 minutes no bleeding noted pressure dressing applied. Pt tolerated well.  with pt. She is leaving with all her belongings.

## 2018-12-22 NOTE — PROGRESS NOTES
Critical Care Outreach Nurse Progress Report:    Subjective: In to assess pt secondary to Jordyn  MEWS Score: 1 (12/21/18 1505)    Vitals:    12/21/18 1056 12/21/18 1505 12/21/18 1944 12/21/18 1949   BP: 105/64 103/55  120/72   Pulse: 86 96  87   Resp: 20 20  20   Temp: 98.7 °F (37.1 °C) 98.8 °F (37.1 °C)  98.3 °F (36.8 °C)   SpO2: 98% 98% 99% 99%   Weight:       Height:            Pain Intensity 1: 0 (12/21/18 1515)  Pain Location 1: Head(sinus area)  Pain Intervention(s) 1: Medication (see MAR)  Patient Stated Pain Goal: 0    Assessment:  Pt in bed in no apparent distress. Respirations even and unlabored, HR- 91, SpO2- 100% on RA, D5 @ 50 ml/hr, denies pain or other needs at this time. Plan:  Will continue to monitor per ORP

## 2018-12-22 NOTE — DISCHARGE INSTRUCTIONS
Epilepsy: Care Instructions  Your Care Instructions    Epilepsy is a common condition that causes repeated seizures. The seizures are caused by bursts of electrical activity in the brain that aren't normal. Seizures may cause problems with muscle control, movement, speech, vision, or awareness. They can be scary. Epilepsy affects each person differently. Some people have only a few seizures. Others get them more often. If you know what triggers a seizure, you may be able to avoid having one. You can take medicines to control and reduce seizures. You and your doctor will need to find the right combination, schedule, and dose of medicine. This may take time and careful changes. Seizures may get worse and happen more often over time. Follow-up care is a key part of your treatment and safety. Be sure to make and go to all appointments, and call your doctor if you are having problems. It's also a good idea to know your test results and keep a list of the medicines you take. How can you care for yourself at home? · Be safe with medicines. Take your medicines exactly as prescribed. Call your doctor if you think you are having a problem with your medicine. · Make a treatment plan with your doctor. Be sure to follow your plan. · Try to identify and avoid things that may make you more likely to have a seizure. These may include:  ? Not getting enough sleep. ? Using drugs or alcohol. ? Being emotionally stressed. ? Skipping meals. · Keep a record of any seizures you have. Note the date, time of day, and any details about the seizure that you can remember. Your doctor can use this information to plan or adjust your medicine or other treatment. · Be sure that any doctor treating you for another condition knows that you have epilepsy. Each doctor should know what medicines you are taking, if any. · Wear a medical ID bracelet. You can buy this at most [employer].  If you have a seizure that leaves you unconscious or unable to speak for yourself, this bracelet will let those who are treating you know that you have epilepsy. · Talk to your doctor about whether it is safe for you to do certain activities, such as drive or swim. When should you call for help? Call 911 anytime you think you may need emergency care. For example, call if:    · A seizure does not stop as it normally does.     · You have new symptoms such as:  ? Numbness, tingling, or weakness on one side of your body or face. ? Vision changes. ? Trouble speaking or thinking clearly.    Call your doctor now or seek immediate medical care if:    · You have a fever.     · You have a severe headache.    Watch closely for changes in your health, and be sure to contact your doctor if:    · The normal pattern or features of your seizures change. Where can you learn more? Go to http://rolyFathomDBkaylie.info/. Gilbert Bhandari in the search box to learn more about \"Epilepsy: Care Instructions. \"  Current as of: June 4, 2018  Content Version: 11.8  © 1194-3157 [employer]. Care instructions adapted under license by [employer] (which disclaims liability or warranty for this information). If you have questions about a medical condition or this instruction, always ask your healthcare professional. [address] any warranty or liability for your use of this information. Altered Mental Status: Care Instructions  Your Care Instructions    Altered mental status is a change in how well your brain is working. As a result, you may be confused, be less alert than usual, or act in odd ways. This may include seeing or hearing things that aren't really there (hallucinations). A mental status change has many possible causes. For example, it may be the result of an infection, an imbalance of chemicals in the body, or a chronic disease such as diabetes or COPD.  It can also be caused by things such as a head injury, taking certain medicines, or using alcohol or drugs. The doctor may do tests to look for the cause. These tests may include urine tests, blood tests, and imaging tests such as a CT scan. Sometimes a clear cause isn't found. But tests can help the doctor rule out a serious cause of your symptoms. A change in mental status can be scary. But mental status will often return to normal when the cause is treated. So it is important to get any follow-up testing or treatment the doctor has suggested. The doctor has checked you carefully, but problems can develop later. If you notice any problems or new symptoms, get medical treatment right away. Follow-up care is a key part of your treatment and safety. Be sure to make and go to all appointments, and call your doctor if you are having problems. It's also a good idea to know your test results and keep a list of the medicines you take. How can you care for yourself at home? · Be safe with medicines. Take your medicines exactly as prescribed. Call your doctor if you think you are having a problem with your medicine. · Have another adult stay with you until you are better. This can help keep you safe. Ask that person to watch for signs that your mental status is getting worse. When should you call for help? Call 911 anytime you think you may need emergency care. For example, call if:    · You passed out (lost consciousness).    Call your doctor now or seek immediate medical care if:    · Your mental status is getting worse.     · You have new symptoms, such as a fever, chills, or shortness of breath.     · You do not feel safe.    Watch closely for changes in your health, and be sure to contact your doctor if:    · You do not get better as expected. Where can you learn more? Go to http://roly-kaylie.info/. Enter L458 in the search box to learn more about \"Altered Mental Status: Care Instructions. \"  Current as of: June 4, 2018  Content Version: 11.8  © 2401-4234 [employer]. Care instructions adapted under license by [employer] (which disclaims liability or warranty for this information). If you have questions about a medical condition or this instruction, always ask your healthcare professional. [address] any warranty or liability for your use of this information. DISCHARGE SUMMARY from Nurse    PATIENT INSTRUCTIONS:    After general anesthesia or intravenous sedation, for 24 hours or while taking prescription Narcotics:  · Limit your activities  · Do not drive and operate hazardous machinery  · Do not make important personal or business decisions  · Do  not drink alcoholic beverages  · If you have not urinated within 8 hours after discharge, please contact your surgeon on call. Report the following to your surgeon:  · Excessive pain, swelling, redness or odor of or around the surgical area  · Temperature over 100.5  · Nausea and vomiting lasting longer than 4 hours or if unable to take medications  · Any signs of decreased circulation or nerve impairment to extremity: change in color, persistent  numbness, tingling, coldness or increase pain  · Any questions    What to do at Home:  Recommended activity: Activity as tolerated,     If you experience any of the following symptoms fever greater then 100.5, pain unrelieved by medication, increase in shortness of breath, please follow up with primary care doctor. *  Please give a list of your current medications to your Primary Care Provider. *  Please update this list whenever your medications are discontinued, doses are      changed, or new medications (including over-the-counter products) are added. *  Please carry medication information at all times in case of emergency situations.     These are general instructions for a healthy lifestyle:    No smoking/ No tobacco products/ Avoid exposure to second hand smoke  Surgeon General's Warning:  Quitting smoking now greatly reduces serious risk to your health. Obesity, smoking, and sedentary lifestyle greatly increases your risk for illness    A healthy diet, regular physical exercise & weight monitoring are important for maintaining a healthy lifestyle    You may be retaining fluid if you have a history of heart failure or if you experience any of the following symptoms:  Weight gain of 3 pounds or more overnight or 5 pounds in a week, increased swelling in our hands or feet or shortness of breath while lying flat in bed. Please call your doctor as soon as you notice any of these symptoms; do not wait until your next office visit. Recognize signs and symptoms of STROKE:    F-face looks uneven    A-arms unable to move or move unevenly    S-speech slurred or non-existent    T-time-call 911 as soon as signs and symptoms begin-DO NOT go       Back to bed or wait to see if you get better-TIME IS BRAIN. Warning Signs of HEART ATTACK     Call 911 if you have these symptoms:   Chest discomfort. Most heart attacks involve discomfort in the center of the chest that lasts more than a few minutes, or that goes away and comes back. It can feel like uncomfortable pressure, squeezing, fullness, or pain.  Discomfort in other areas of the upper body. Symptoms can include pain or discomfort in one or both arms, the back, neck, jaw, or stomach.  Shortness of breath with or without chest discomfort.  Other signs may include breaking out in a cold sweat, nausea, or lightheadedness. Don't wait more than five minutes to call 911 - MINUTES MATTER! Fast action can save your life. Calling 911 is almost always the fastest way to get lifesaving treatment. Emergency Medical Services staff can begin treatment when they arrive -- up to an hour sooner than if someone gets to the hospital by car. The discharge information has been reviewed with the patient. The patient verbalized understanding.   Discharge medications reviewed with the patient and appropriate educational materials and side effects teaching were provided.   ___________________________________________________________________________________________________________________________________

## 2018-12-22 NOTE — DISCHARGE SUMMARY
Hospitalist Discharge Summary     Admit Date:  2018  8:27 PM   Name:  Gypsy Motley   Age:  79 y.o.  :  1950   MRN:  [id number]   PCP:  Chandrakant Greenberg MD  Treatment Team: Attending Provider: Lupe Reynoso DO; Consulting Provider: Karel Headley MD; Care Manager: Vlad Rangel RN; Utilization Review: Teresita Gayle RN; Staff Nurse: Aj Granger RN, JUAN PABLO Barry    PROBLEMS:   Hyponatremia -Improved  SIADH : Recent pneumonia contributing  History of hyponatremia in the past  Thyroid studies normal  Hypokalemia :  Replaced per Nephrology  Hypocalcemia:  Added Vitamin D 5000 units daily  Anemia:  Iron studies complete  Euvolemic:  Corrected  Seizure activity, isolated  CAD  Hypertension  Ostoporosis  Hyperlipidemia  Anxiety    Hospital Course:  Patient presented to ER  with altered mental status with twitching x 45 minutes prior to arrival followed by unresponsiveness per , has not been awake since seizure type activity began. EMS reported no apparent seizure history. By the time of arrival, she was responsive, but unable to follow commands. No seizure history, diagnosed with pneumonia, placed on zithromax and levaquin. Since then, she has been noticed drinking several sprites, gatorades. Otherwise no illness. Agitated and fidgety on exam. Has not taken ativan in several weeks. ER MD feels more like seizure activity. Patient unwilling or unable to look to the left past the midline. ER MD spoke with Taryn Salazar, who interviewed patient. teleneuro recommended MRI brain, EEG, ativan, keppra. Then sodium was found at 107. Nephrology consulted, in to see patient immediately. Begun on 500 ml 3% saline solution infused at 50 ml/hr x 4 hours, then 30 ml per hour after. Also given potassium, bicarb, vitamin G for hypocalcemia, All electrolytes abnormal.  Dr Stuart Sevilla, Nephrology in, admitted to ICU.   :  Pulmonary in for leapfrog protocol. EEG complete. Continued headache and nausea, but improved. NA+: 120, 3%  Saline infusion discontinued for now. Neurology in.   12/20:  Feeling better overall. Some continued nausea. Na+ up to 129, now on D5W due to rapid Na+ correction. Transferred to floor bed. PT, OT and nutrition in.   12/21:  Anxious to go home. No further seizure activity. Dr Blanco Ballard discussed avoidance of free water intake. Follow up instructions and discharge meds at bottom of this note. Plan was discussed with patient, , RN. All questions answered. Patient was stable at time of discharge. Diagnostic Imaging/Tests:     EEG:  Normal electroencephalogram, awake, asleep and with activation procedures. There are no focal lateralizing or epileptiform features. Increased fast activity suggests medication effect :query benzodiazepines     12/18:  CT HEAD: Mild scattered periventricular white matter hypointensities consistent with chronic ischemic white matter change. Subtle small right subinsular cortex white matter hypointensity. No evidence of intracranial mass or hemorrhage. The ventricles are normal in size and position. The basal cisterns are patent. No extra-axial fluid collection or mass effect. The orbital contents are within normal limits. Bilateral air-fluid levels within the maxillary sinuses and scattered ethmoidal air cell mucosal thickening. There  is mucosal thickening of the right greater than left sphenoid sinus. The mastoid air cells and middle ears are clear. No significant osseous or extracranial soft tissue lesions. IMPRESSION:  No evidence of intracranial hemorrhage. Subtle small right subinsular cortex white matter hypointensity, small infarct not excluded. Brain MRI can be considered for further evaluation if clinically indicated. Mild periventricular white matter hypointensities consistent with chronic Ischemic white matter change.     12/19:  CXR: A portable AP radiograph of the chest was obtained at 0037 hours. The patient is on a cardiac monitor. The lungs are clear. Needle hernia unchanged. . The bones and soft tissues are grossly within normal limits. IMPRESSION: No acute cardiopulmonary disease. Hiatal hernia unchanged.     12/19:  MRI BRAIN:  On the T2-weighted and FLAIR sequences, there are multiple hyperintense lesions scattered throughout the supratentorial white matter. Findings are nonspecific and can be seen with chronic small vessel ischemic disease, demyelinating disease or with migraine headaches. There is subtotal opacification of the maxillary and right-sided ethmoid air cells. There is no acute infarction, acute intracranial hemorrhage, hydrocephalus or abnormal extra-axial fluid collection. IMPRESSION:  Motion artifact. White matter findings present as described. This pattern may be present with chronic small vessel ischemic disease. Paranasal sinus opacification    Labs: Results:       BMP, Mg, Phos Recent Labs     12/22/18  0617 12/21/18  2118 12/21/18  0549  12/20/18  0316 12/19/18 2118   * 132* 131*   < > 129* 128*   K 4.1 4.2 4.3   < > 3.5 4.1   CL 98 100 99   < > 97* 97*   CO2 22 23 23   < > 24 25   AGAP 12 9 9   < > 8 6*   BUN 7* 8 7*   < > 9 8   CREA 0.99 1.04* 1.00   < > 0.95 0.95   CA 8.3 7.9* 7.5*   < > 7.8* 7.3*   GLU 98 164* 97   < > 96 100   MG  --   --  2.0  --  2.2  --    PHOS  --   --  2.5  --  1.7* 1.7*    < > = values in this interval not displayed.       Coagulation Tests Lab Results   Component Value Date/Time    INR (POC) 1.0 12/18/2018 08:49 PM    aPTT 23.9 (L) 12/18/2018 08:50 PM      A1c Lab Results   Component Value Date/Time    Hemoglobin A1c 5.6 12/19/2018 03:00 AM      Thyroid Panel Lab Results   Component Value Date/Time    TSH 2.880 12/19/2018 03:29 PM    T4, Free 1.3 12/19/2018 03:29 PM        Most Recent UA Lab Results   Component Value Date/Time    WBC 0-3 05/17/2017 12:37 PM    RBC 0 05/17/2017 12:37 PM    Epithelial cells 0-3 05/17/2017 12:37 PM    Bacteria 4+ (H) 05/17/2017 12:37 PM    Casts 0-3 05/17/2017 12:37 PM        Allergies   Allergen Reactions    Pcn [Penicillins] Rash     All Labs from Last 24 Hrs:  Recent Results (from the past 24 hour(s))   GLUCOSE, POC    Collection Time: 12/21/18  3:46 PM   Result Value Ref Range    Glucose (POC) 121 (H) 65 - 100 mg/dL   GLUCOSE, POC    Collection Time: 12/21/18  8:09 PM   Result Value Ref Range    Glucose (POC) 103 (H) 65 - 309 mg/dL   METABOLIC PANEL, BASIC    Collection Time: 12/21/18  9:18 PM   Result Value Ref Range    Sodium 132 (L) 136 - 145 mmol/L    Potassium 4.2 3.5 - 5.1 mmol/L    Chloride 100 98 - 107 mmol/L    CO2 23 21 - 32 mmol/L    Anion gap 9 7 - 16 mmol/L    Glucose 164 (H) 65 - 100 mg/dL    BUN 8 8 - 23 MG/DL    Creatinine 1.04 (H) 0.6 - 1.0 MG/DL    GFR est AA >60 >60 ml/min/1.73m2    GFR est non-AA 56 (L) >60 ml/min/1.73m2    Calcium 7.9 (L) 8.3 - 10.4 MG/DL   GLUCOSE, POC    Collection Time: 12/22/18  5:26 AM   Result Value Ref Range    Glucose (POC) 91 65 - 013 mg/dL   METABOLIC PANEL, BASIC    Collection Time: 12/22/18  6:17 AM   Result Value Ref Range    Sodium 132 (L) 136 - 145 mmol/L    Potassium 4.1 3.5 - 5.1 mmol/L    Chloride 98 98 - 107 mmol/L    CO2 22 21 - 32 mmol/L    Anion gap 12 7 - 16 mmol/L    Glucose 98 65 - 100 mg/dL    BUN 7 (L) 8 - 23 MG/DL    Creatinine 0.99 0.6 - 1.0 MG/DL    GFR est AA >60 >60 ml/min/1.73m2    GFR est non-AA 60 (L) >60 ml/min/1.73m2    Calcium 8.3 8.3 - 10.4 MG/DL   GLUCOSE, POC    Collection Time: 12/22/18 10:51 AM   Result Value Ref Range    Glucose (POC) 92 65 - 100 mg/dL     Current Med List in Hospital:   Current Facility-Administered Medications   Medication Dose Route Frequency    levoFLOXacin (LEVAQUIN) tablet 750 mg  750 mg Oral Q48H    NUTRITIONAL SUPPORT ELECTROLYTE PRN ORDERS   Does Not Apply PRN    ascorbic acid (vitamin C) (VITAMIN C) tablet 500 mg  500 mg Oral DAILY    sodium bicarbonate tablet 650 mg  650 mg Oral Q6H    insulin lispro (HUMALOG) injection   SubCUTAneous AC&HS    heparin (porcine) injection 5,000 Units  5,000 Units SubCUTAneous Q8H    cholecalciferol (VITAMIN D3) tablet 5,000 Units  5,000 Units Oral DAILY    sodium chloride (NS) flush 20 mL  20 mL InterCATHeter Q8H    heparin (porcine) pf 600 Units  600 Units InterCATHeter Q8H    sodium chloride (NS) flush 20 mL  20 mL InterCATHeter PRN    heparin (porcine) pf 600 Units  600 Units InterCATHeter PRN    sodium chloride (NS) flush 5-10 mL  5-10 mL IntraVENous Q8H    sodium chloride (NS) flush 5-10 mL  5-10 mL IntraVENous PRN    acetaminophen (TYLENOL) tablet 650 mg  650 mg Oral Q4H PRN    HYDROcodone-acetaminophen (NORCO) 5-325 mg per tablet 1 Tab  1 Tab Oral Q4H PRN    naloxone (NARCAN) injection 0.4 mg  0.4 mg IntraVENous PRN    LORazepam (ATIVAN) tablet 0.5 mg  0.5 mg Oral Q4H PRN       Discharge Exam:  Patient Vitals for the past 24 hrs:   Temp Pulse Resp BP SpO2   12/22/18 1058 98.2 °F (36.8 °C) (!) 116 20 131/82 97 %   12/22/18 0721 98 °F (36.7 °C) 88 20 137/80 99 %   12/22/18 0410 98.5 °F (36.9 °C) (!) 118 20 105/66 97 %   12/22/18 0004 98.1 °F (36.7 °C) 99 20 108/69 99 %   12/21/18 1949 98.3 °F (36.8 °C) 87 20 120/72 99 %   12/21/18 1944     99 %   12/21/18 1505 98.8 °F (37.1 °C) 96 20 103/55 98 %     Oxygen Therapy  O2 Sat (%): 97 % (12/22/18 1058)  Pulse via Oximetry: 100 beats per minute (12/21/18 1944)  O2 Device: Room air (12/21/18 1944)    Intake/Output Summary (Last 24 hours) at 12/22/2018 1131  Last data filed at 12/22/2018 0823  Gross per 24 hour   Intake 360 ml   Output 1200 ml   Net -840 ml     General appearance: Oriented and alert, cooperative, family at bedside. Head: Normocephalic, without obvious abnormality, atraumatic  Eyes: conjunctivae/corneas clear.  PERRL   Throat: Lips, mucosa, and tongue normal. Teeth and gums normal  Neck: supple, symmetrical, trachea midline, no JVD  Lungs: clear to auscultation bilaterally  Heart: regular rate and rhythm, S1, S2 normal, no murmur, click, rub or gallop  Abdomen: soft, non-tender. Bowel sounds normal. No masses,  no organomegaly  Extremities: extremities normal, atraumatic, no cyanosis or edema  Skin: Skin color, texture, turgor normal. No rashes or lesions  Neurologic: Grossly normal     Discharge Info:   Current Discharge Medication List      CONTINUE these medications which have NOT CHANGED    Details   atorvastatin calcium (ATORVASTATIN PO) Take  by mouth. ATENOLOL PO Take  by mouth. aspirin delayed-release 81 mg tablet Take 81 mg by mouth daily. promethazine (PHENERGAN) 25 mg tablet Take 1 Tab by mouth every six (6) hours as needed. Qty: 12 Tab, Refills: 0      hyoscyamine (LEVSIN) 0.125 mg tablet Take 1 Tab by mouth every four (4) hours as needed for Cramping. Qty: 8 Tab, Refills: 0           Disposition: home    Activity: Activity as tolerated  Diet: DIET REGULAR   Monitor free water     FOLLOW UP VISIT Appointment in: Other (Specify) AS SOON AS POSSIBLE WITH DR THOMAS OR INTERNIST HE SENDS YOU TO       NOTE:  PATIENT STATES SHE SEES DR THOMAS IN [facility]. UNABLE TO LOCATE HIM IN THE SYSTEM OR INTERNET. SHE WILL MAKE AN APPOINTMENT ON Monday. Time spent in patient discharge planning and coordination 45 minutes.     Signed:  Latoya Joy NP

## 2021-04-16 ENCOUNTER — HOSPITAL ENCOUNTER (OUTPATIENT)
Dept: LAB | Age: 71
Discharge: HOME | End: 2021-04-16

## 2021-04-16 PROCEDURE — 88305 TISSUE EXAM BY PATHOLOGIST: CPT

## 2022-03-19 PROBLEM — E87.1 HYPONATREMIA: Status: ACTIVE | Noted: 2018-12-18

## 2022-03-19 PROBLEM — E83.51 HYPOCALCEMIA: Status: ACTIVE | Noted: 2018-12-19

## 2022-03-19 PROBLEM — E78.5 HLD (HYPERLIPIDEMIA): Status: ACTIVE | Noted: 2018-12-19

## 2022-03-19 PROBLEM — M81.0 OSTEOPOROSIS: Status: ACTIVE | Noted: 2018-12-19

## 2022-03-19 PROBLEM — E87.20 METABOLIC ACIDOSIS: Status: ACTIVE | Noted: 2018-12-19

## 2022-03-20 PROBLEM — G40.409 TONIC-CLONIC SEIZURE DISORDER (HCC): Status: ACTIVE | Noted: 2018-12-18

## 2022-03-20 PROBLEM — E83.42 HYPOMAGNESEMIA: Status: ACTIVE | Noted: 2018-12-19

## 2022-03-20 PROBLEM — E87.6 HYPOKALEMIA: Status: ACTIVE | Noted: 2018-12-19

## 2024-10-16 ENCOUNTER — APPOINTMENT (OUTPATIENT)
Dept: GENERAL RADIOLOGY | Age: 74
End: 2024-10-16
Payer: MEDICARE

## 2024-10-16 ENCOUNTER — HOSPITAL ENCOUNTER (EMERGENCY)
Age: 74
Discharge: HOME | End: 2024-10-16
Attending: GENERAL PRACTICE
Payer: MEDICARE

## 2024-10-16 VITALS
RESPIRATION RATE: 14 BRPM | TEMPERATURE: 98 F | SYSTOLIC BLOOD PRESSURE: 166 MMHG | HEART RATE: 66 BPM | OXYGEN SATURATION: 99 % | DIASTOLIC BLOOD PRESSURE: 65 MMHG

## 2024-10-16 DIAGNOSIS — R00.2 PALPITATIONS: Primary | ICD-10-CM

## 2024-10-16 LAB
ALBUMIN SERPL-MCNC: 3.7 G/DL (ref 3.2–4.6)
ALBUMIN/GLOB SERPL: 1.1 (ref 1–1.9)
ALP SERPL-CCNC: 66 U/L (ref 35–104)
ALT SERPL-CCNC: 19 U/L (ref 8–45)
ANION GAP SERPL CALC-SCNC: 10 MMOL/L (ref 9–18)
AST SERPL-CCNC: 30 U/L (ref 15–37)
BASOPHILS # BLD: 0 K/UL (ref 0–0.2)
BASOPHILS NFR BLD: 0 % (ref 0–2)
BILIRUB SERPL-MCNC: 0.2 MG/DL (ref 0–1.2)
BUN SERPL-MCNC: 23 MG/DL (ref 8–23)
CALCIUM SERPL-MCNC: 9.3 MG/DL (ref 8.8–10.2)
CHLORIDE SERPL-SCNC: 105 MMOL/L (ref 98–107)
CO2 SERPL-SCNC: 26 MMOL/L (ref 20–28)
CREAT SERPL-MCNC: 1.14 MG/DL (ref 0.6–1.1)
DIFFERENTIAL METHOD BLD: ABNORMAL
EOSINOPHIL # BLD: 0.3 K/UL (ref 0–0.8)
EOSINOPHIL NFR BLD: 4 % (ref 0.5–7.8)
ERYTHROCYTE [DISTWIDTH] IN BLOOD BY AUTOMATED COUNT: 14.8 % (ref 11.9–14.6)
GLOBULIN SER CALC-MCNC: 3.4 G/DL (ref 2.3–3.5)
GLUCOSE SERPL-MCNC: 109 MG/DL (ref 70–99)
HCT VFR BLD AUTO: 37.8 % (ref 35.8–46.3)
HGB BLD-MCNC: 11.6 G/DL (ref 11.7–15.4)
IMM GRANULOCYTES # BLD AUTO: 0 K/UL (ref 0–0.5)
IMM GRANULOCYTES NFR BLD AUTO: 1 % (ref 0–5)
LYMPHOCYTES # BLD: 2.3 K/UL (ref 0.5–4.6)
LYMPHOCYTES NFR BLD: 36 % (ref 13–44)
MCH RBC QN AUTO: 27.1 PG (ref 26.1–32.9)
MCHC RBC AUTO-ENTMCNC: 30.7 G/DL (ref 31.4–35)
MCV RBC AUTO: 88.3 FL (ref 82–102)
MONOCYTES # BLD: 0.7 K/UL (ref 0.1–1.3)
MONOCYTES NFR BLD: 11 % (ref 4–12)
NEUTS SEG # BLD: 3.1 K/UL (ref 1.7–8.2)
NEUTS SEG NFR BLD: 48 % (ref 43–78)
NRBC # BLD: 0 K/UL (ref 0–0.2)
NT PRO BNP: 1602 PG/ML (ref 0–125)
PLATELET # BLD AUTO: 235 K/UL (ref 150–450)
PMV BLD AUTO: 10.3 FL (ref 9.4–12.3)
POTASSIUM SERPL-SCNC: 3.9 MMOL/L (ref 3.5–5.1)
PROT SERPL-MCNC: 7.1 G/DL (ref 6.3–8.2)
RBC # BLD AUTO: 4.28 M/UL (ref 4.05–5.2)
SODIUM SERPL-SCNC: 141 MMOL/L (ref 136–145)
TROPONIN T SERPL HS-MCNC: 8 NG/L (ref 0–14)
WBC # BLD AUTO: 6.5 K/UL (ref 4.3–11.1)

## 2024-10-16 PROCEDURE — 80053 COMPREHEN METABOLIC PANEL: CPT

## 2024-10-16 PROCEDURE — 99284 EMERGENCY DEPT VISIT MOD MDM: CPT

## 2024-10-16 PROCEDURE — 85025 COMPLETE CBC W/AUTO DIFF WBC: CPT

## 2024-10-16 PROCEDURE — 83880 ASSAY OF NATRIURETIC PEPTIDE: CPT

## 2024-10-16 PROCEDURE — 84484 ASSAY OF TROPONIN QUANT: CPT

## 2024-10-16 PROCEDURE — 71045 X-RAY EXAM CHEST 1 VIEW: CPT

## 2024-10-16 ASSESSMENT — PAIN - FUNCTIONAL ASSESSMENT
PAIN_FUNCTIONAL_ASSESSMENT: 0-10
PAIN_FUNCTIONAL_ASSESSMENT: 0-10

## 2024-10-16 ASSESSMENT — PAIN SCALES - GENERAL
PAINLEVEL_OUTOF10: 0
PAINLEVEL_OUTOF10: 6
PAINLEVEL_OUTOF10: 0

## 2024-10-16 NOTE — ED PROVIDER NOTES
Emergency Department Provider Note       PCP: Gigi Lazaro PA   Age: 74 y.o.   Sex: female     DISPOSITION Decision To Discharge 10/16/2024 08:34:55 PM  Condition at Disposition: Data Unavailable       ICD-10-CM    1. Palpitations  R00.2 [facility] [place]          Medical Decision Making     Patient presents with palpitations.  Not really having any chest pain.  Workup here is mostly negative.  Mild elevation of BNP but normal chest x-ray.  She is describing some GARRETT at home.  Though, does not appear to be in overt heart failure so will not start any diuretics.  However, I will refer her to [facility] cardiology regarding the palpitations and these mild irregularities.  Will likely need an outpatient echocardiogram.  I doubt ACS, PE, or any other cardiopulmonary emergency.  Doubt significant dysrhythmias.  Patient will follow-up with outpatient cardiology and strict return precautions are given.     1 or more acute illnesses that pose a threat to life or bodily function.   Patient was discharged risks and benefits of hospitalization were considered.  Shared medical decision making was utilized in creating the patients health plan today.    I independently ordered and reviewed each unique test.  I reviewed external records: provider visit note from outside specialist.  I reviewed external records: previous EKG including cardiologist interpretation.    I reviewed external records: previous lab results from outside ED.  I reviewed external records: previous imaging study including radiologist interpretation.     I interpreted the X-rays chest x-ray shows no evidence of infiltrate or edema and no cardiomegaly.  I have reviewed and agree with radiology report.  My Independent EKG Interpretation: sinus rhythm, no evidence of arrhythmia      ST Segments:Nonspecific ST segments - NO STEMI   Rate: 73 patient does have mild nonspecific irregularities some depressions on lateral leads but this was also seen  Sosa                   No results for input(s): \"COVID19\" in the last 72 hours.    Voice dictation software was used during the making of this note.  This software is not perfect and grammatical and other typographical errors may be present.  This note has not been completely proofread for errors.     Won Landon DO  10/16/24 2039

## 2024-10-17 NOTE — ED NOTES
Patient mobility status  with no difficulty. Provider aware     I have reviewed discharge instructions with the patient.  The patient verbalized understanding.    Patient left ED via Discharge Method: ambulatory to Home with Significant Other.    Opportunity for questions and clarification provided.     Patient given 0 scripts.           Rick Bonilla RN  10/16/24 2050

## 2025-02-04 ENCOUNTER — HOSPITAL ENCOUNTER (OUTPATIENT)
Dept: MAMMOGRAPHY | Age: 75
Discharge: HOME | End: 2025-02-07
Payer: MEDICARE

## 2025-02-04 DIAGNOSIS — R92.8 ABNORMAL SCREENING MAMMOGRAM: ICD-10-CM

## 2025-02-04 PROCEDURE — 76642 ULTRASOUND BREAST LIMITED: CPT

## 2025-02-04 PROCEDURE — G0279 TOMOSYNTHESIS, MAMMO: HCPCS

## 2025-02-06 ENCOUNTER — TRANSCRIBE ORDERS (OUTPATIENT)
Dept: SCHEDULING | Age: 75
End: 2025-02-06

## 2025-02-06 DIAGNOSIS — Z12.31 OTHER SCREENING MAMMOGRAM: Primary | ICD-10-CM
